# Patient Record
Sex: FEMALE | Race: WHITE | NOT HISPANIC OR LATINO | Employment: FULL TIME | URBAN - METROPOLITAN AREA
[De-identification: names, ages, dates, MRNs, and addresses within clinical notes are randomized per-mention and may not be internally consistent; named-entity substitution may affect disease eponyms.]

---

## 2017-01-11 ENCOUNTER — OFFICE VISIT (OUTPATIENT)
Dept: GASTROENTEROLOGY | Facility: CLINIC | Age: 37
End: 2017-01-11

## 2017-01-11 VITALS
BODY MASS INDEX: 47.09 KG/M2 | HEIGHT: 66 IN | HEART RATE: 107 BPM | DIASTOLIC BLOOD PRESSURE: 74 MMHG | TEMPERATURE: 98.4 F | OXYGEN SATURATION: 98 % | SYSTOLIC BLOOD PRESSURE: 128 MMHG | WEIGHT: 293 LBS

## 2017-01-11 DIAGNOSIS — R79.89 ABNORMAL LIVER FUNCTION TESTS: ICD-10-CM

## 2017-01-11 DIAGNOSIS — K76.0 NON-ALCOHOLIC FATTY LIVER DISEASE: Primary | ICD-10-CM

## 2017-01-11 PROCEDURE — 99213 OFFICE O/P EST LOW 20 MIN: CPT | Performed by: INTERNAL MEDICINE

## 2017-01-11 NOTE — MR AVS SNAPSHOT
Norma Bee   2017 2:00 PM   Office Visit    Dept Phone:  [phone]   Encounter #:  [id number]    Provider:  Deshaun Bonilla MD   Department:  [facility]                Your Full Care Plan              Your Updated Medication List          This list is accurate as of: 17  2:23 PM.  Always use your most recent med list.                cholecalciferol 1000 UNITS tablet   Commonly known as:  VITAMIN D3       FETZIMA 80 MG capsule sustained-release 24 hr   Generic drug:  Levomilnacipran HCl ER       fish oil 1000 MG capsule capsule       freestyle lancets       FREESTYLE LITE test strip   Generic drug:  glucose blood   USE ONE STRIP TO TEST DAILY       hydrOXYzine 25 MG tablet   Commonly known as:  ATARAX       levothyroxine 50 MCG tablet   Commonly known as:  SYNTHROID, LEVOTHROID   Take 1 tablet by mouth Daily.       MULTI-VITAMIN GUMMIES PO               We Performed the Following     Hepatic Function Panel       You Were Diagnosed With        Codes Comments    Non-alcoholic fatty liver disease    -  Primary ICD-10-CM: K76.0  ICD-9-CM: 571.8     Abnormal liver function tests     ICD-10-CM: R79.89  ICD-9-CM: 790.6       Instructions     None    Patient Instructions History      Upcoming Appointments     Visit Type Date Time Department    OFFICE VISIT 2017  2:00 PM [facility]      [employer] Signup     [facility] allows you to send messages to your doctor, view your test results, renew your prescriptions, schedule appointments, and more. To sign up, go to [employer] and click on the Sign Up Now link in the New User? box. Enter your [employer] Activation Code exactly as it appears below along with the last four digits of your Social Security Number and your Date of Birth () to complete the sign-up process. If you do not sign up before the expiration date, you must request a new code.    [employer]  "Activation Code: [email or web address]  Expires: 1/25/2017  2:23 PM    If you have questions, you can email [email or web address] or call [phone] to talk to our MyChart staff. Remember, [employer] is NOT to be used for urgent needs. For medical emergencies, dial 911.               Other Info from Your Visit           Allergies     No Known Allergies      Reason for Visit     Liver Follow-up Fatty Liver disease      Vital Signs     Blood Pressure Pulse Temperature Height Weight Oxygen Saturation    128/74 (BP Location: Right arm, Patient Position: Sitting) 107 98.4 °F (36.9 °C) (Temporal Artery ) 66\" (167.6 cm) 318 lb 12.8 oz (145 kg) 98%    Body Mass Index Smoking Status                51.46 kg/m2 Never Smoker          Problems and Diagnoses Noted     Non-alcoholic fatty liver disease    -  Primary    Abnormal liver function test            "

## 2017-01-11 NOTE — PROGRESS NOTES
Chief Complaint   Patient presents with   • Liver Follow-up     Fatty Liver disease       History of Present Illness:   HPI  Ms. Bee returns to the office for a follow-up visit.  The patient denies any right upper abdominal pain with meals.  She generally will notice some discomfort on the right side if she is anxious.  There is no history of vomiting but the patient may have some nausea and again this seems to be more related to anxiety.  There is no history of difficult or painful swallowing.  The patient has continued to be able to intentionally lose weight gradually.  She denies any blood in the stool.  There is no history of change in bowel habits.  Past Medical History   Diagnosis Date   • Endometrial cancer    • Irritation of right eye 04/07/2016     she will see optho today for exam  Aye Ventura (Internal Medicine)   • Leukocytosis 11/10/2015     repreat in 2 weeks  Aye Ventura (Internal Medicine)   • Ovarian cancer 10/26/2015     followed by gyn/onc uk  Aye Ventura (Internal Medicine)   • RUQ pain 10/26/2015      u/s  Aye Ventura (Internal Medicine)   • Thrombocytosis        Past Surgical History   Procedure Laterality Date   • Hysterectomy     • Left oophorectomy           Current Outpatient Prescriptions:   •  cholecalciferol (VITAMIN D3) 1000 UNITS tablet, Take 1,000 Units by mouth Daily., Disp: , Rfl:   •  FREESTYLE LITE test strip, USE ONE STRIP TO TEST DAILY, Disp: 100 each, Rfl: 0  •  hydrOXYzine (ATARAX) 25 MG tablet, Take 25 mg by mouth 3 (Three) Times a Day As Needed for itching., Disp: , Rfl:   •  Lancets (FREESTYLE) lancets, , Disp: , Rfl:   •  Levomilnacipran HCl ER (FETZIMA) 80 MG capsule sustained-release 24 hr, Take  by mouth Daily., Disp: , Rfl:   •  levothyroxine (SYNTHROID, LEVOTHROID) 50 MCG tablet, Take 1 tablet by mouth Daily., Disp: 30 tablet, Rfl: 1  •  Multiple Vitamins-Minerals (MULTI-VITAMIN GUMMIES PO), Take  by mouth daily., Disp: , Rfl:   •  Omega-3  Fatty Acids (FISH OIL) 1000 MG capsule capsule, Take  by mouth daily with breakfast., Disp: , Rfl:     No Known Allergies    Family History   Problem Relation Age of Onset   • Arthritis Other      osteoarthritis   • Multiple myeloma Other    • Lung cancer Other    • Lymphoma Other      non-Hodgkin's lymphoma   • Thyroid disease Other    • Lymphoma Mother    • Alzheimer's disease Mother    • Thyroid disease Mother    • Diabetes Father    • Cirrhosis Paternal Grandfather        Social History     Social History   • Marital status: Single     Spouse name: N/A   • Number of children: N/A   • Years of education: N/A     Occupational History   • Not on file.     Social History Main Topics   • Smoking status: Never Smoker   • Smokeless tobacco: Never Used   • Alcohol use No      Comment: Rare   • Drug use: No   • Sexual activity: Not on file      Comment: Single      Other Topics Concern   • Not on file     Social History Narrative       Review of Systems   Constitutional: Positive for fatigue. Negative for appetite change, fever and unexpected weight change.   HENT: Positive for sinus pressure. Negative for dental problem, mouth sores, postnasal drip, sneezing, trouble swallowing and voice change.    Eyes: Negative.  Negative for pain, redness and itching.   Respiratory: Negative.  Negative for cough, shortness of breath and wheezing.    Cardiovascular: Negative.  Negative for chest pain, palpitations and leg swelling.   Gastrointestinal: Positive for abdominal pain. Negative for abdominal distention, anal bleeding, blood in stool, constipation, diarrhea, nausea, rectal pain and vomiting.   Endocrine: Negative.  Negative for cold intolerance, heat intolerance, polydipsia and polyuria.   Genitourinary: Negative.  Negative for dysuria, enuresis, flank pain, hematuria and urgency.   Musculoskeletal: Positive for back pain. Negative for arthralgias, joint swelling and myalgias.   Skin: Negative.  Negative for color change,  pallor and rash.   Allergic/Immunologic: Positive for environmental allergies. Negative for food allergies and immunocompromised state.   Neurological: Positive for dizziness. Negative for tremors, seizures, facial asymmetry, numbness and headaches.   Hematological: Negative.    Psychiatric/Behavioral: Positive for sleep disturbance. Negative for behavioral problems, dysphoric mood, hallucinations and self-injury. The patient is nervous/anxious.        Vitals:    01/11/17 1347   BP: 128/74   Pulse: 107   Temp: 98.4 °F (36.9 °C)   SpO2: 98%       Physical Exam   Constitutional: She is oriented to person, place, and time. She appears well-developed and well-nourished. No distress.   obese   HENT:   Head: Normocephalic and atraumatic.   Mouth/Throat: Oropharynx is clear and moist. No oropharyngeal exudate.   Eyes: Conjunctivae and EOM are normal. Pupils are equal, round, and reactive to light. No scleral icterus.   Neck: Normal range of motion. Neck supple. No thyromegaly present.   Cardiovascular: Normal rate, regular rhythm and normal heart sounds.  Exam reveals no gallop.    No murmur heard.  Pulmonary/Chest: Effort normal and breath sounds normal. She has no wheezes. She has no rales.   Abdominal: Soft. Bowel sounds are normal. There is no tenderness. There is no rebound and no guarding.   Musculoskeletal: Normal range of motion. She exhibits no edema or tenderness.   Lymphadenopathy:     She has no cervical adenopathy.   Neurological: She is alert and oriented to person, place, and time. She has normal reflexes. She exhibits normal muscle tone.   Skin: Skin is dry. No rash noted. No erythema.   Psychiatric: She has a normal mood and affect. Her behavior is normal. Thought content normal.   Vitals reviewed.      Norma was seen today for liver follow-up.    Diagnoses and all orders for this visit:    Non-alcoholic fatty liver disease  -     Hepatic Function Panel  -     US Liver; Future    Abnormal liver  function tests  The patient has no stigmata of chronic liver disease. She has been able to lose weight gradually over the last year.  The patient does not have any right upper abdominal pain that is related to meals. She denies any epigastric discomfort.      Plan: Encouraged continued gradual weight loss.           Will check a hepatic function panel today.           Will schedule for an ultrasound of the liver.           Will follow up in 6 months.    I spent 50% of the visit counseling and answering questions from the patient.

## 2017-01-12 LAB
ALBUMIN SERPL-MCNC: 4.4 G/DL (ref 3.2–4.8)
ALP SERPL-CCNC: 108 U/L (ref 25–100)
ALT SERPL-CCNC: 33 U/L (ref 7–40)
AST SERPL-CCNC: 27 U/L (ref 0–33)
BILIRUB DIRECT SERPL-MCNC: 0.1 MG/DL (ref 0–0.2)
BILIRUB SERPL-MCNC: 0.4 MG/DL (ref 0.3–1.2)
PROT SERPL-MCNC: 7.8 G/DL (ref 5.7–8.2)

## 2017-01-13 ENCOUNTER — TELEPHONE (OUTPATIENT)
Dept: GASTROENTEROLOGY | Facility: CLINIC | Age: 37
End: 2017-01-13

## 2017-01-13 NOTE — TELEPHONE ENCOUNTER
----- Message from Deshaun Bonilla MD sent at 1/12/2017  3:21 PM EST -----  Let Ms. Bee know that the liver test was good.  Thanks,  GMW

## 2017-01-18 ENCOUNTER — HOSPITAL ENCOUNTER (OUTPATIENT)
Dept: ULTRASOUND IMAGING | Facility: HOSPITAL | Age: 37
Discharge: HOME | End: 2017-01-18
Attending: INTERNAL MEDICINE | Admitting: INTERNAL MEDICINE

## 2017-01-18 ENCOUNTER — TELEPHONE (OUTPATIENT)
Dept: GASTROENTEROLOGY | Facility: CLINIC | Age: 37
End: 2017-01-18

## 2017-01-18 DIAGNOSIS — K76.0 NON-ALCOHOLIC FATTY LIVER DISEASE: ICD-10-CM

## 2017-01-18 PROCEDURE — 76705 ECHO EXAM OF ABDOMEN: CPT

## 2017-01-18 NOTE — TELEPHONE ENCOUNTER
I called and discussed the results of the ultrasound. I stated there were gallstones present. She does not have any symptoms of biliary colic at this juncture.

## 2017-01-31 ENCOUNTER — OFFICE VISIT (OUTPATIENT)
Dept: RETAIL CLINIC | Facility: CLINIC | Age: 37
End: 2017-01-31

## 2017-01-31 VITALS
OXYGEN SATURATION: 98 % | TEMPERATURE: 98.4 F | DIASTOLIC BLOOD PRESSURE: 86 MMHG | HEIGHT: 66 IN | HEART RATE: 101 BPM | SYSTOLIC BLOOD PRESSURE: 124 MMHG | RESPIRATION RATE: 18 BRPM | WEIGHT: 293 LBS | BODY MASS INDEX: 47.09 KG/M2

## 2017-01-31 DIAGNOSIS — J35.8 TONSIL STONE: ICD-10-CM

## 2017-01-31 DIAGNOSIS — H65.01 RIGHT ACUTE SEROUS OTITIS MEDIA, RECURRENCE NOT SPECIFIED: Primary | ICD-10-CM

## 2017-01-31 DIAGNOSIS — J02.9 ACUTE PHARYNGITIS, UNSPECIFIED ETIOLOGY: ICD-10-CM

## 2017-01-31 PROBLEM — D75.839 THROMBOCYTHEMIA: Status: ACTIVE | Noted: 2017-01-31

## 2017-01-31 PROBLEM — D72.829 LEUKOCYTOSIS: Status: ACTIVE | Noted: 2017-01-31

## 2017-01-31 LAB
EXPIRATION DATE: NORMAL
INTERNAL CONTROL: NORMAL
Lab: NORMAL
S PYO AG THROAT QL: NEGATIVE

## 2017-01-31 PROCEDURE — 87880 STREP A ASSAY W/OPTIC: CPT | Performed by: NURSE PRACTITIONER

## 2017-01-31 PROCEDURE — 99213 OFFICE O/P EST LOW 20 MIN: CPT | Performed by: NURSE PRACTITIONER

## 2017-01-31 RX ORDER — FLUTICASONE PROPIONATE 50 MCG
2 SPRAY, SUSPENSION (ML) NASAL DAILY
Qty: 1 EACH | Refills: 0 | Status: SHIPPED | OUTPATIENT
Start: 2017-01-31 | End: 2017-03-02

## 2017-01-31 RX ORDER — LORATADINE 10 MG/1
10 TABLET ORAL DAILY
Qty: 30 TABLET | Refills: 0 | Status: SHIPPED | OUTPATIENT
Start: 2017-01-31 | End: 2017-02-24

## 2017-01-31 RX ORDER — PSEUDOEPHEDRINE HCL 30 MG
30 TABLET ORAL EVERY 6 HOURS PRN
Qty: 24 TABLET | Refills: 0 | Status: SHIPPED | OUTPATIENT
Start: 2017-01-31 | End: 2017-02-24

## 2017-01-31 NOTE — PATIENT INSTRUCTIONS
Tonsil Stones (Tonsilloliths)  If someone asked you where stones can form in the human body, you might think of the kidneys. But, the kidneys aren't the only place. The tonsils are another location where hard, and sometimes, painful stones may develop in certain people.    What Are Tonsils?    Your tonsils are gland-like structures in the back of your throat. You have one located in a pocket on each side. Tonsils are made of tissue that contains lymphocytes -- cells in your body that prevent and fight infections. It is believed that the tonsils play a role in the immune system and are meant to function like nets, trapping incoming bacteria and virus particles that are passing through your throat.    Most medical experts agree that the tonsils often do not perform their job well. In many instances, they become more of a hindrance than a help. It may be that tonsils evolved in an environment where humans were not exposed to as many germs as we encounter today as a result of living in areas with relatively high populations. Evidence suggests that people who have had their tonsils removed are no more likely to suffer from bacterial or viral infections than people with intact tonsils.    What Causes Tonsil Stones?    Your tonsils are filled with nooks and crannies where bacteria and other materials, including dead cells and mucous, can become trapped. When this happens, the debris can become concentrated in white formations that occur in the pockets.    Tonsil stones, or tonsilloliths, are formed when this trapped debris hardens, or calcifies. This tends to happen most often in people who have chronic inflammation in their tonsils or repeated bouts of tonsillitis.    While many people have small tonsilloliths that develop in their tonsils, it is quite rare to have a large and solidified tonsil stone.    What Are the Symptoms of Tonsil Stones?    Many small tonsil stones do not cause any noticeable symptoms. Even when  they are large, some tonsil stones are only discovered incidentally on X-rays or CT scans. Some larger tonsilloliths, however, may have multiple symptoms:    Bad breath . One of the prime indicators of a tonsil stone is exceedingly bad breath, or halitosis, that accompanies a tonsil infection. One study of patients with a form of chronic tonsillitis used a special test to see if volatile sulfur compounds were contained in the subjects' breath. The presence of these foul-smelling compounds provides evidence of bad breath. The researchers found that 75% of the people who had abnormally high concentrations of these compounds also had tonsil stones. Other researchers have suggested that tonsil stones be considered in situations when the cause of bad breath is in question.  Sore throat . When a tonsil stone and tonsillitis occur together, it can be difficult to determine whether the pain in your throat is caused by your infection or the tonsil stone. The presence of a tonsil stone itself, though, may cause you to feel pain or discomfort in the area where it is lodged.  White debris. Some tonsil stones are visible in the back of the throat as a lump of solid white material. This is not always the case. Often they are hidden in the folds of the tonsils. In these instances, they may only be detectable with the help of non-invasive scanning techniques, such as CT scans or magnetic resonance imaging.  Difficulty swallowing. Depending on the location or size of the tonsil stone, it may be difficult or painful to swallow foods or liquids.  Ear pain . Tonsil stones can develop anywhere in the tonsil. Because of shared nerve pathways, they may cause a person to feel pain in the ear, even though the stone itself is not touching the ear.  Tonsil swelling. When collected debris hardens and a tonsil stone forms, inflammation from infection (if present) and the tonsil stone itself may cause a tonsil to swell or become larger.  How Are  Tonsil Stones Treated?    The appropriate treatment for a tonsil stone depends on the size of the tonsillolith and its potential to cause discomfort or harm. Options include:    No treatment. Many tonsil stones, especially ones that have no symptoms, require no special treatment.  At-home removal. Some people choose to dislodge tonsil stones at home with the use of picks or swabs.  Salt water gargles. Gargling with warm, salty water may help ease the discomfort of tonsillitis, which often accompanies tonsil stones.  Antibiotics. Various antibiotics can be used to treat tonsil stones. While they may be helpful for some people, they cannot correct the basic problem that is causing tonsilloliths. Also, antibiotics can have side effects.  Surgical removal. When tonsil stones are exceedingly large and symptomatic, it may be necessary for a surgeon to remove them. In certain instances, a doctor will be able to perform this relatively simple procedure using a local numbing agent. Then the patient will not need general anesthesia.  Can Tonsil Stones Be Prevented?    Since tonsil stones are more common in people who have chronic tonsillitis, the only surefire way to prevent them is with surgical removal of the tonsils. This procedure, known as a tonsillectomy, removes the tissues of the tonsils entirely, thereby eliminating the possibility of tonsillolith formation.    Unlike tonsil stone extraction, tonsillectomies are typically performed under general anesthesia. Patients who undergo the surgery have difficulty swallowing and a sore throat for at least a few days after the procedure.  Serous Otitis Media  Serous otitis media is fluid in the middle ear space. This space contains the bones for hearing and air. Air in the middle ear space helps to transmit sound.   The air gets there through the eustachian tube. This tube goes from the back of the nose (nasopharynx) to the middle ear space. It keeps the pressure in the middle  ear the same as the outside world. It also helps to drain fluid from the middle ear space.  CAUSES   Serous otitis media occurs when the eustachian tube gets blocked. Blockage can come from:  · Ear infections.  · Colds and other upper respiratory infections.  · Allergies.  · Irritants such as cigarette smoke.  · Sudden changes in air pressure (such as descending in an airplane).  · Enlarged adenoids.  · A mass in the nasopharynx.  During colds and upper respiratory infections, the middle ear space can become temporarily filled with fluid. This can happen after an ear infection also. Once the infection clears, the fluid will generally drain out of the ear through the eustachian tube. If it does not, then serous otitis media occurs.  SIGNS AND SYMPTOMS   · Hearing loss.  · A feeling of fullness in the ear, without pain.  · Young children may not show any symptoms but may show slight behavioral changes, such as agitation, ear pulling, or crying.  DIAGNOSIS   Serous otitis media is diagnosed by an ear exam. Tests may be done to check on the movement of the eardrum. Hearing exams may also be done.  TREATMENT   The fluid most often goes away without treatment. If allergy is the cause, allergy treatment may be helpful. Fluid that persists for several months may require minor surgery. A small tube is placed in the eardrum to:  · Drain the fluid.  · Restore the air in the middle ear space.  In certain situations, antibiotic medicines are used to avoid surgery. Surgery may be done to remove enlarged adenoids (if this is the cause).  HOME CARE INSTRUCTIONS   · Keep children away from tobacco smoke.  · Keep all follow-up visits as directed by your health care provider.  SEEK MEDICAL CARE IF:   · Your hearing is not better in 3 months.  · Your hearing is worse.  · You have ear pain.  · You have drainage from the ear.  · You have dizziness.  · You have serous otitis media only in one ear or have any bleeding from your nose  (epistaxis).  · You notice a lump on your neck.  MAKE SURE YOU:  · Understand these instructions.    · Will watch your condition.    · Will get help right away if you are not doing well or get worse.       This information is not intended to replace advice given to you by your health care provider. Make sure you discuss any questions you have with your health care provider.     Document Released: 03/09/2005 Document Revised: 01/08/2016 Document Reviewed: 07/15/2014  [employer] Interactive Patient Education ©2016 [employer]

## 2017-01-31 NOTE — MR AVS SNAPSHOT
Norma Bee   1/31/2017 12:30 PM   Office Visit    Dept Phone:  [phone]   Encounter #:  [id number]    Provider:  ZACHARY DENIS   Department:  [facility]                Your Full Care Plan              Today's Medication Changes          These changes are accurate as of: 1/31/17  1:01 PM.  If you have any questions, ask your nurse or doctor.               New Medication(s)Ordered:     fluticasone 50 MCG/ACT nasal spray   Commonly known as:  FLONASE   2 sprays into each nostril Daily for 30 days.       loratadine 10 MG tablet   Commonly known as:  CLARITIN   Take 1 tablet by mouth Daily.       pseudoephedrine 30 MG tablet   Commonly known as:  SUDAFED   Take 1 tablet by mouth Every 6 (Six) Hours As Needed for congestion.            Where to Get Your Medications      These medications were sent to [address] - [address] [place] [address] - [phone]  - [phone]   [address] [place] [address]     Phone:  [phone]     fluticasone 50 MCG/ACT nasal spray    loratadine 10 MG tablet    pseudoephedrine 30 MG tablet                  Your Updated Medication List          This list is accurate as of: 1/31/17  1:01 PM.  Always use your most recent med list.                cholecalciferol 1000 UNITS tablet   Commonly known as:  VITAMIN D3       FETZIMA 80 MG capsule sustained-release 24 hr   Generic drug:  Levomilnacipran HCl ER       fish oil 1000 MG capsule capsule       fluticasone 50 MCG/ACT nasal spray   Commonly known as:  FLONASE   2 sprays into each nostril Daily for 30 days.       freestyle lancets       FREESTYLE LITE test strip   Generic drug:  glucose blood   USE ONE STRIP TO TEST DAILY       hydrOXYzine 25 MG tablet   Commonly known as:  ATARAX       levothyroxine 50 MCG tablet   Commonly known as:  SYNTHROID, LEVOTHROID   Take 1 tablet by mouth Daily.       loratadine 10 MG tablet   Commonly known as:   CLARITIN   Take 1 tablet by mouth Daily.       MULTI-VITAMIN GUMMIES PO       pseudoephedrine 30 MG tablet   Commonly known as:  SUDAFED   Take 1 tablet by mouth Every 6 (Six) Hours As Needed for congestion.               You Were Diagnosed With        Codes Comments    Right acute serous otitis media, recurrence not specified    -  Primary ICD-10-CM: H65.01  ICD-9-CM: 381.01     Tonsil stone     ICD-10-CM: J35.8  ICD-9-CM: 474.8       Instructions    Tonsil Stones (Tonsilloliths)  If someone asked you where stones can form in the human body, you might think of the kidneys. But, the kidneys aren't the only place. The tonsils are another location where hard, and sometimes, painful stones may develop in certain people.    What Are Tonsils?    Your tonsils are gland-like structures in the back of your throat. You have one located in a pocket on each side. Tonsils are made of tissue that contains lymphocytes -- cells in your body that prevent and fight infections. It is believed that the tonsils play a role in the immune system and are meant to function like nets, trapping incoming bacteria and virus particles that are passing through your throat.    Most medical experts agree that the tonsils often do not perform their job well. In many instances, they become more of a hindrance than a help. It may be that tonsils evolved in an environment where humans were not exposed to as many germs as we encounter today as a result of living in areas with relatively high populations. Evidence suggests that people who have had their tonsils removed are no more likely to suffer from bacterial or viral infections than people with intact tonsils.    What Causes Tonsil Stones?    Your tonsils are filled with nooks and crannies where bacteria and other materials, including dead cells and mucous, can become trapped. When this happens, the debris can become concentrated in white formations that occur in the pockets.    Tonsil stones, or  tonsilloliths, are formed when this trapped debris hardens, or calcifies. This tends to happen most often in people who have chronic inflammation in their tonsils or repeated bouts of tonsillitis.    While many people have small tonsilloliths that develop in their tonsils, it is quite rare to have a large and solidified tonsil stone.    What Are the Symptoms of Tonsil Stones?    Many small tonsil stones do not cause any noticeable symptoms. Even when they are large, some tonsil stones are only discovered incidentally on X-rays or CT scans. Some larger tonsilloliths, however, may have multiple symptoms:    Bad breath . One of the prime indicators of a tonsil stone is exceedingly bad breath, or halitosis, that accompanies a tonsil infection. One study of patients with a form of chronic tonsillitis used a special test to see if volatile sulfur compounds were contained in the subjects' breath. The presence of these foul-smelling compounds provides evidence of bad breath. The researchers found that 75% of the people who had abnormally high concentrations of these compounds also had tonsil stones. Other researchers have suggested that tonsil stones be considered in situations when the cause of bad breath is in question.  Sore throat . When a tonsil stone and tonsillitis occur together, it can be difficult to determine whether the pain in your throat is caused by your infection or the tonsil stone. The presence of a tonsil stone itself, though, may cause you to feel pain or discomfort in the area where it is lodged.  White debris. Some tonsil stones are visible in the back of the throat as a lump of solid white material. This is not always the case. Often they are hidden in the folds of the tonsils. In these instances, they may only be detectable with the help of non-invasive scanning techniques, such as CT scans or magnetic resonance imaging.  Difficulty swallowing. Depending on the location or size of the tonsil stone,  it may be difficult or painful to swallow foods or liquids.  Ear pain . Tonsil stones can develop anywhere in the tonsil. Because of shared nerve pathways, they may cause a person to feel pain in the ear, even though the stone itself is not touching the ear.  Tonsil swelling. When collected debris hardens and a tonsil stone forms, inflammation from infection (if present) and the tonsil stone itself may cause a tonsil to swell or become larger.  How Are Tonsil Stones Treated?    The appropriate treatment for a tonsil stone depends on the size of the tonsillolith and its potential to cause discomfort or harm. Options include:    No treatment. Many tonsil stones, especially ones that have no symptoms, require no special treatment.  At-home removal. Some people choose to dislodge tonsil stones at home with the use of picks or swabs.  Salt water gargles. Gargling with warm, salty water may help ease the discomfort of tonsillitis, which often accompanies tonsil stones.  Antibiotics. Various antibiotics can be used to treat tonsil stones. While they may be helpful for some people, they cannot correct the basic problem that is causing tonsilloliths. Also, antibiotics can have side effects.  Surgical removal. When tonsil stones are exceedingly large and symptomatic, it may be necessary for a surgeon to remove them. In certain instances, a doctor will be able to perform this relatively simple procedure using a local numbing agent. Then the patient will not need general anesthesia.  Can Tonsil Stones Be Prevented?    Since tonsil stones are more common in people who have chronic tonsillitis, the only surefire way to prevent them is with surgical removal of the tonsils. This procedure, known as a tonsillectomy, removes the tissues of the tonsils entirely, thereby eliminating the possibility of tonsillolith formation.    Unlike tonsil stone extraction, tonsillectomies are typically performed under general anesthesia. Patients  who undergo the surgery have difficulty swallowing and a sore throat for at least a few days after the procedure.  Serous Otitis Media  Serous otitis media is fluid in the middle ear space. This space contains the bones for hearing and air. Air in the middle ear space helps to transmit sound.   The air gets there through the eustachian tube. This tube goes from the back of the nose (nasopharynx) to the middle ear space. It keeps the pressure in the middle ear the same as the outside world. It also helps to drain fluid from the middle ear space.  CAUSES   Serous otitis media occurs when the eustachian tube gets blocked. Blockage can come from:  · Ear infections.  · Colds and other upper respiratory infections.  · Allergies.  · Irritants such as cigarette smoke.  · Sudden changes in air pressure (such as descending in an airplane).  · Enlarged adenoids.  · A mass in the nasopharynx.  During colds and upper respiratory infections, the middle ear space can become temporarily filled with fluid. This can happen after an ear infection also. Once the infection clears, the fluid will generally drain out of the ear through the eustachian tube. If it does not, then serous otitis media occurs.  SIGNS AND SYMPTOMS   · Hearing loss.  · A feeling of fullness in the ear, without pain.  · Young children may not show any symptoms but may show slight behavioral changes, such as agitation, ear pulling, or crying.  DIAGNOSIS   Serous otitis media is diagnosed by an ear exam. Tests may be done to check on the movement of the eardrum. Hearing exams may also be done.  TREATMENT   The fluid most often goes away without treatment. If allergy is the cause, allergy treatment may be helpful. Fluid that persists for several months may require minor surgery. A small tube is placed in the eardrum to:  · Drain the fluid.  · Restore the air in the middle ear space.  In certain situations, antibiotic medicines are used to avoid surgery. Surgery may  be done to remove enlarged adenoids (if this is the cause).  HOME CARE INSTRUCTIONS   · Keep children away from tobacco smoke.  · Keep all follow-up visits as directed by your health care provider.  SEEK MEDICAL CARE IF:   · Your hearing is not better in 3 months.  · Your hearing is worse.  · You have ear pain.  · You have drainage from the ear.  · You have dizziness.  · You have serous otitis media only in one ear or have any bleeding from your nose (epistaxis).  · You notice a lump on your neck.  MAKE SURE YOU:  · Understand these instructions.    · Will watch your condition.    · Will get help right away if you are not doing well or get worse.       This information is not intended to replace advice given to you by your health care provider. Make sure you discuss any questions you have with your health care provider.     Document Released: 2005 Document Revised: 2016 Document Reviewed: 07/15/2014  [employer] Interactive Patient Education © [employer]       Patient Instructions History      Upcoming Appointments     Visit Type Date Time Department    OFFICE VISIT 2017 12:30 PM [facility] [place]    OFFICE VISIT 2017  1:30 PM [facility] [place]      [employer] Signup     [employer] allows you to send messages to your doctor, view your test results, renew your prescriptions, schedule appointments, and more. To sign up, go to [employer] and click on the Sign Up Now link in the New User? box. Enter your [employer] Activation Code exactly as it appears below along with the last four digits of your Social Security Number and your Date of Birth () to complete the sign-up process. If you do not sign up before the expiration date, you must request a new code.    [employer] Activation Code: [email or web address]  Expires: 2017  1:01 PM    If you have questions, you can email [email or web address] or call [phone] to talk to our [employer] staff. Remember,  "MyChart is NOT to be used for urgent needs. For medical emergencies, dial 911.               Other Info from Your Visit           Your Appointments     Jul 12, 2017  1:30 PM EDT   Office Visit with Deshaun Bonilla MD   [facility] (--)    [address]  [facility] [id number] [phone]           Arrive 15 minutes prior to appointment.              Allergies     No Known Allergies      Reason for Visit     Sore Throat     Earache right for 3 days    Sinusitis           Vital Signs     Blood Pressure Pulse Temperature Respirations Height Weight    124/86 (BP Location: Right arm, Patient Position: Sitting, Cuff Size: Large Adult) 101 98.4 °F (36.9 °C) 18 66\" (167.6 cm) 319 lb 3.2 oz (145 kg)    Oxygen Saturation Body Mass Index Smoking Status             98% 51.52 kg/m2 Never Smoker         Problems and Diagnoses Noted     Elevated white blood cell count    High platelet count    Right middle ear infection    -  Primary    Tonsil stone            "

## 2017-01-31 NOTE — PROGRESS NOTES
Subjective   Norma Bee is a 36 y.o. female.   Chief Complaint   Patient presents with   • Sore Throat   • Earache     right for 3 days   • Sinusitis     History of Present Illness Struggling with sinus for week and half, right ear acing for 3 days with low level ticking sound. Today throat feeling irritated and noticed 2 white spots. Has not taken any cold med      The following portions of the patient's history were reviewed and updated as appropriate: allergies, current medications, past medical history, past social history, past surgical history and problem list.    Review of Systems   Constitutional: Negative for fever.   HENT: Positive for congestion, ear pain, sinus pressure and sore throat.    Respiratory: Positive for cough. Negative for shortness of breath.    Cardiovascular: Negative.    Gastrointestinal: Positive for nausea.       Objective   Vitals:    01/31/17 1234   BP: 124/86   Pulse: 101   Resp: 18   Temp: 98.4 °F (36.9 °C)   SpO2: 98%     Physical Exam   Constitutional: She is oriented to person, place, and time. She appears well-developed and well-nourished. She does not appear ill. No distress.   HENT:   Right Ear: Tympanic membrane is injected.   Left Ear: Tympanic membrane and ear canal normal.   Right EAC slightly erythematous.  Tonsils 1 +, erythema with bilateral tonsiliths     Cardiovascular: Normal rate, regular rhythm and normal heart sounds.    Pulmonary/Chest: Effort normal and breath sounds normal.   Lymphadenopathy:     She has cervical adenopathy (shotty, tender).   Neurological: She is alert and oriented to person, place, and time.   Skin: Skin is warm and dry.     Results for orders placed or performed in visit on 01/31/17   POC Rapid Strep A   Result Value Ref Range    Rapid Strep A Screen Negative Negative, VALID, INVALID, Not Performed    Internal Control Passed Passed    Lot Number [id number]     Expiration Date 7/2018            Assessment/Plan   Norma was seen  today for sore throat, earache and sinusitis.    Diagnoses and all orders for this visit:    Right acute serous otitis media, recurrence not specified    Tonsil stone    Acute pharyngitis, unspecified etiology  -     POC Rapid Strep A    Other orders  -     fluticasone (FLONASE) 50 MCG/ACT nasal spray; 2 sprays into each nostril Daily for 30 days.  -     loratadine (CLARITIN) 10 MG tablet; Take 1 tablet by mouth Daily.  -     pseudoephedrine (SUDAFED) 30 MG tablet; Take 1 tablet by mouth Every 6 (Six) Hours As Needed for congestion.                   Home care instruction reviewed with patient and/or caregiver who acknowledges understanding, questions answered.    Karishma Crandall, APRN

## 2017-02-24 ENCOUNTER — OFFICE VISIT (OUTPATIENT)
Dept: INTERNAL MEDICINE | Facility: CLINIC | Age: 37
End: 2017-02-24

## 2017-02-24 VITALS
OXYGEN SATURATION: 99 % | TEMPERATURE: 97.7 F | DIASTOLIC BLOOD PRESSURE: 90 MMHG | WEIGHT: 293 LBS | SYSTOLIC BLOOD PRESSURE: 120 MMHG | HEART RATE: 84 BPM | BODY MASS INDEX: 52.5 KG/M2

## 2017-02-24 DIAGNOSIS — E11.9 CONTROLLED TYPE 2 DIABETES MELLITUS WITHOUT COMPLICATION, WITHOUT LONG-TERM CURRENT USE OF INSULIN (HCC): ICD-10-CM

## 2017-02-24 DIAGNOSIS — J30.1 SEASONAL ALLERGIC RHINITIS DUE TO POLLEN: Primary | ICD-10-CM

## 2017-02-24 DIAGNOSIS — G47.09 OTHER INSOMNIA: ICD-10-CM

## 2017-02-24 LAB
ALBUMIN SERPL-MCNC: 4 G/DL (ref 3.2–4.8)
ALBUMIN/GLOB SERPL: 1.3 G/DL (ref 1.5–2.5)
ALP SERPL-CCNC: 115 U/L (ref 25–100)
ALT SERPL W P-5'-P-CCNC: 25 U/L (ref 7–40)
ANION GAP SERPL CALCULATED.3IONS-SCNC: 12 MMOL/L (ref 3–11)
AST SERPL-CCNC: 24 U/L (ref 0–33)
BILIRUB SERPL-MCNC: 0.3 MG/DL (ref 0.3–1.2)
BUN BLD-MCNC: 16 MG/DL (ref 9–23)
BUN/CREAT SERPL: 17.8 (ref 7–25)
CALCIUM SPEC-SCNC: 9.2 MG/DL (ref 8.7–10.4)
CHLORIDE SERPL-SCNC: 104 MMOL/L (ref 99–109)
CO2 SERPL-SCNC: 20 MMOL/L (ref 20–31)
CREAT BLD-MCNC: 0.9 MG/DL (ref 0.6–1.3)
GFR SERPL CREATININE-BSD FRML MDRD: 70 ML/MIN/1.73
GLOBULIN UR ELPH-MCNC: 3.2 GM/DL
GLUCOSE BLD-MCNC: 135 MG/DL (ref 70–100)
GLUCOSE BLDC GLUCOMTR-MCNC: 174 MG/DL (ref 70–130)
HBA1C MFR BLD: 5.3 %
POC CREATININE URINE: 200
POC MICROALBUMIN URINE: 30
POTASSIUM BLD-SCNC: 4 MMOL/L (ref 3.5–5.5)
PROT SERPL-MCNC: 7.2 G/DL (ref 5.7–8.2)
SODIUM BLD-SCNC: 136 MMOL/L (ref 132–146)

## 2017-02-24 PROCEDURE — 82044 UR ALBUMIN SEMIQUANTITATIVE: CPT | Performed by: INTERNAL MEDICINE

## 2017-02-24 PROCEDURE — 99213 OFFICE O/P EST LOW 20 MIN: CPT | Performed by: INTERNAL MEDICINE

## 2017-02-24 PROCEDURE — 90471 IMMUNIZATION ADMIN: CPT | Performed by: INTERNAL MEDICINE

## 2017-02-24 PROCEDURE — 82947 ASSAY GLUCOSE BLOOD QUANT: CPT | Performed by: INTERNAL MEDICINE

## 2017-02-24 PROCEDURE — 90686 IIV4 VACC NO PRSV 0.5 ML IM: CPT | Performed by: INTERNAL MEDICINE

## 2017-02-24 PROCEDURE — 36415 COLL VENOUS BLD VENIPUNCTURE: CPT | Performed by: INTERNAL MEDICINE

## 2017-02-24 PROCEDURE — 82570 ASSAY OF URINE CREATININE: CPT | Performed by: INTERNAL MEDICINE

## 2017-02-24 PROCEDURE — 80053 COMPREHEN METABOLIC PANEL: CPT | Performed by: INTERNAL MEDICINE

## 2017-02-24 PROCEDURE — 83036 HEMOGLOBIN GLYCOSYLATED A1C: CPT | Performed by: INTERNAL MEDICINE

## 2017-02-24 RX ORDER — CETIRIZINE HYDROCHLORIDE 10 MG/1
10 TABLET ORAL DAILY
Qty: 30 TABLET | Refills: 3 | Status: SHIPPED | OUTPATIENT
Start: 2017-02-24 | End: 2017-07-21

## 2017-02-24 NOTE — PROGRESS NOTES
Subjective   Norma Bee is a 36 y.o. female.   Chief Complaint   Patient presents with   • Allergies   • Insomnia         Insomnia   This is a chronic problem. The current episode started more than 1 year ago. The problem occurs daily. Associated symptoms include congestion. Pertinent negatives include no abdominal pain, arthralgias, chest pain, chills, coughing, diaphoresis, fatigue, fever, headaches, myalgias, nausea, neck pain, numbness, rash, sore throat or vomiting. Treatments tried: had sleep study and is using cpap.   Diabetes   She presents for her follow-up diabetic visit. Pertinent negatives for hypoglycemia include no confusion, headaches, nervousness/anxiousness, pallor, seizures or speech difficulty. Pertinent negatives for diabetes include no chest pain, no fatigue, no polydipsia and no polyphagia. There are no diabetic complications. Meal planning includes carbohydrate counting.    Allergies that are seasonal times years. Claritin not helping anymore.     The following portions of the patient's history were reviewed and updated as appropriate: allergies, current medications, past family history, past medical history, past social history, past surgical history and problem list.  Past Medical History   Diagnosis Date   • Endometrial cancer    • Endometrial cancer      follows with gyn onc at    • Irritation of right eye 04/07/2016     she will see optho today for exam  [facility] (Internal Medicine)   • Leukocytosis 11/10/2015     repreat in 2 weeks  [facility] (Internal Medicine)   • Ovarian cancer 10/26/2015     followed by gyn/onc   [facility] (Internal Medicine)   • Ovarian cancer    • RUQ pain 10/26/2015      u/s  [facility] (Internal Medicine)   • Thrombocytosis        Review of Systems   Constitutional: Negative for activity change, appetite change, chills, diaphoresis, fatigue, fever and unexpected weight change.   HENT: Positive for congestion. Negative  for ear discharge, ear pain, mouth sores, nosebleeds, sinus pressure, sneezing and sore throat.    Eyes: Negative for pain, discharge and itching.   Respiratory: Negative for cough, chest tightness, shortness of breath and wheezing.    Cardiovascular: Negative for chest pain, palpitations and leg swelling.   Gastrointestinal: Negative for abdominal pain, constipation, diarrhea, nausea and vomiting.   Endocrine: Negative for cold intolerance, heat intolerance, polydipsia and polyphagia.   Genitourinary: Negative for dysuria, flank pain, frequency, hematuria and urgency.   Musculoskeletal: Negative for arthralgias, back pain, gait problem, myalgias, neck pain and neck stiffness.   Skin: Negative for color change, pallor and rash.   Neurological: Negative for seizures, speech difficulty, numbness and headaches.   Psychiatric/Behavioral: Positive for sleep disturbance. Negative for agitation, confusion and decreased concentration. The patient has insomnia. The patient is not nervous/anxious.      Visit Vitals   • /90   • Pulse 84   • Temp 97.7 °F (36.5 °C)   • Wt (!) 325 lb 4.8 oz (148 kg)   • SpO2 99%   • BMI 52.5 kg/m2       Objective   Physical Exam   Constitutional: She appears well-developed.   HENT:   Head: Normocephalic.   Right Ear: External ear normal.   Left Ear: External ear normal.   Nose: Nose normal.   Mouth/Throat: Oropharynx is clear and moist.   Eyes: Conjunctivae are normal. Pupils are equal, round, and reactive to light.   Neck: No JVD present. No thyromegaly present.   Cardiovascular: Normal rate, regular rhythm and normal heart sounds.  Exam reveals no friction rub.    No murmur heard.  Pulmonary/Chest: Effort normal and breath sounds normal. No respiratory distress. She has no wheezes. She has no rales.   Abdominal: Soft. Bowel sounds are normal. She exhibits no distension. There is no tenderness. There is no guarding.   Musculoskeletal: She exhibits no edema or tenderness.    Norma had  a diabetic foot exam performed today.  Lymphadenopathy:     She has no cervical adenopathy.   Neurological: She displays normal reflexes. No cranial nerve deficit.   Skin: No rash noted.   Psychiatric: Her behavior is normal.   Nursing note and vitals reviewed.      Assessment/Plan   Norma was seen today for allergies and insomnia.    Diagnoses and all orders for this visit:    Seasonal allergic rhinitis due to pollen  -     cetirizine (zyrTEC) 10 MG tablet; Take 1 tablet by mouth Daily.    Other insomnia  -     Ambulatory Referral to Sleep Medicine    Controlled type 2 diabetes mellitus without complication, without long-term current use of insulin  -     Comprehensive Metabolic Panel  -     POC Microalbumin  -     POC Glucose Fingerstick  -     POC Glycosylated Hemoglobin (Hb A1C)  A1c is 5.3 and is not on any diabetic meds.   Other orders  -     Flu Vaccine Split Quad

## 2017-03-19 ENCOUNTER — OFFICE VISIT (OUTPATIENT)
Dept: RETAIL CLINIC | Facility: CLINIC | Age: 37
End: 2017-03-19

## 2017-03-19 VITALS — TEMPERATURE: 97.7 F | OXYGEN SATURATION: 98 % | HEART RATE: 84 BPM

## 2017-03-19 DIAGNOSIS — H10.021 PINK EYE, RIGHT: Primary | ICD-10-CM

## 2017-03-19 PROCEDURE — 99213 OFFICE O/P EST LOW 20 MIN: CPT | Performed by: NURSE PRACTITIONER

## 2017-03-19 NOTE — PROGRESS NOTES
Subjective   Norma Bee is a 36 y.o. female.   Chief Complaint   Patient presents with   • Conjunctivitis     History of Present Illness C/O right eye redness, irritation, itching for a couple of days. Only has slight drainage and some crusting in the mornings. Is light sensitive but no vision change. No recollection of trauma to eye. Not contact lense wearer. HAs tried allergy drops but hasn't helped.    The following portions of the patient's history were reviewed and updated as appropriate: allergies, current medications, past medical history, past social history, past surgical history and problem list.    Review of Systems   HENT: Positive for congestion. Ear pain: ear pressure right.    Eyes: Positive for photophobia, discharge, redness and itching. Negative for visual disturbance.       Objective   Vitals:    03/19/17 1112   Pulse: 84   Temp: 97.7 °F (36.5 °C)   SpO2: 98%     Physical Exam   Constitutional: She is oriented to person, place, and time. She appears well-developed and well-nourished. No distress.   Eyes: EOM are normal. Right conjunctiva is injected (more medial).       Lymphadenopathy:     She has no cervical adenopathy.   Neurological: She is alert and oriented to person, place, and time.   Skin: Skin is warm.           Assessment/Plan   Norma was seen today for conjunctivitis.    Diagnoses and all orders for this visit:    Pink eye, right    Other orders  -     neomycin-polymyxin-dexamethasone (MAXITROL) 0.1 % ophthalmic suspension; Administer 1 drop to the right eye 4 (Four) Times a Day for 7 days.                   Home care instruction reviewed with patient and/or caregiver who acknowledges understanding, questions answered.    Karishma Crandall, XAVIER

## 2017-03-29 ENCOUNTER — OFFICE VISIT (OUTPATIENT)
Dept: INTERNAL MEDICINE | Facility: CLINIC | Age: 37
End: 2017-03-29

## 2017-03-29 VITALS
DIASTOLIC BLOOD PRESSURE: 80 MMHG | OXYGEN SATURATION: 98 % | TEMPERATURE: 98.3 F | WEIGHT: 293 LBS | HEART RATE: 86 BPM | SYSTOLIC BLOOD PRESSURE: 100 MMHG | BODY MASS INDEX: 52.86 KG/M2

## 2017-03-29 DIAGNOSIS — R05.9 COUGH: ICD-10-CM

## 2017-03-29 DIAGNOSIS — J02.9 SORE THROAT: Primary | ICD-10-CM

## 2017-03-29 DIAGNOSIS — J06.9 VIRAL URI WITH COUGH: ICD-10-CM

## 2017-03-29 LAB
EXPIRATION DATE: NORMAL
EXPIRATION DATE: NORMAL
FLUAV AG NPH QL: NEGATIVE
FLUBV AG NPH QL: NEGATIVE
INTERNAL CONTROL: NORMAL
INTERNAL CONTROL: NORMAL
Lab: NORMAL
Lab: NORMAL
S PYO AG THROAT QL: NEGATIVE

## 2017-03-29 PROCEDURE — 99213 OFFICE O/P EST LOW 20 MIN: CPT | Performed by: INTERNAL MEDICINE

## 2017-03-29 PROCEDURE — 87804 INFLUENZA ASSAY W/OPTIC: CPT | Performed by: INTERNAL MEDICINE

## 2017-03-29 PROCEDURE — 87880 STREP A ASSAY W/OPTIC: CPT | Performed by: INTERNAL MEDICINE

## 2017-03-29 RX ORDER — LEVOTHYROXINE SODIUM 0.05 MG/1
50 TABLET ORAL DAILY
Qty: 90 TABLET | Refills: 0 | Status: ON HOLD | OUTPATIENT
Start: 2017-03-29 | End: 2018-11-13

## 2017-03-29 NOTE — PROGRESS NOTES
Subjective   Norma Bee is a 36 y.o. female.   Chief Complaint   Patient presents with   • Cough       History of Present Illness   Cough and sore throat since Sunday.  No vomiting or diarrhea.  No abdominal pain.   The following portions of the patient's history were reviewed and updated as appropriate: allergies, current medications, past family history, past medical history, past social history, past surgical history and problem list.    Review of Systems   Constitutional: Negative for activity change, appetite change, chills, diaphoresis, fatigue, fever and unexpected weight change.   HENT: Positive for sore throat. Negative for congestion, ear discharge, ear pain, mouth sores, nosebleeds, sinus pressure and sneezing.    Eyes: Negative for pain, discharge and itching.   Respiratory: Positive for cough. Negative for chest tightness, shortness of breath and wheezing.    Cardiovascular: Negative for chest pain, palpitations and leg swelling.   Gastrointestinal: Negative for abdominal pain, constipation, diarrhea, nausea and vomiting.   Endocrine: Negative for cold intolerance, heat intolerance, polydipsia and polyphagia.   Genitourinary: Negative for dysuria, flank pain, frequency, hematuria and urgency.   Musculoskeletal: Negative for arthralgias, back pain, gait problem, myalgias, neck pain and neck stiffness.   Skin: Negative for color change, pallor and rash.   Neurological: Negative for seizures, speech difficulty, numbness and headaches.   Psychiatric/Behavioral: Negative for agitation, confusion, decreased concentration and sleep disturbance. The patient is not nervous/anxious.      /80  Pulse 86  Temp 98.3 °F (36.8 °C)  Wt (!) 327 lb 8 oz (149 kg)  SpO2 98%  BMI 52.86 kg/m2    Objective   Physical Exam   Constitutional: She appears well-developed.   HENT:   Head: Normocephalic.   Right Ear: External ear normal.   Left Ear: External ear normal.   Nose: Nose normal.   Mouth/Throat:  Oropharynx is clear and moist.   Eyes: Conjunctivae are normal. Pupils are equal, round, and reactive to light.   Neck: No JVD present. No thyromegaly present.   Cardiovascular: Normal rate, regular rhythm and normal heart sounds.  Exam reveals no friction rub.    No murmur heard.  Pulmonary/Chest: Effort normal and breath sounds normal. No respiratory distress. She has no wheezes. She has no rales.   Abdominal: Soft. Bowel sounds are normal. She exhibits no distension. There is no tenderness. There is no guarding.   Musculoskeletal: She exhibits no edema or tenderness.   Lymphadenopathy:     She has no cervical adenopathy.   Neurological: She displays normal reflexes. No cranial nerve deficit.   Skin: No rash noted.   Psychiatric: Her behavior is normal.   Nursing note and vitals reviewed.      Assessment/Plan   Norma was seen today for cough.    Diagnoses and all orders for this visit:    Sore throat  -     POCT rapid strep A    Cough  -     POCT Influenza A/B    Viral URI with cough  Fluids, rest  Other orders  -     levothyroxine (SYNTHROID, LEVOTHROID) 50 MCG tablet; Take 1 tablet by mouth Daily.      Flu a and b negative.  Is seeing a therapist at [facility] for her depression

## 2017-04-20 ENCOUNTER — OFFICE VISIT (OUTPATIENT)
Dept: INTERNAL MEDICINE | Facility: CLINIC | Age: 37
End: 2017-04-20

## 2017-04-20 VITALS
OXYGEN SATURATION: 99 % | WEIGHT: 293 LBS | HEART RATE: 78 BPM | BODY MASS INDEX: 52.57 KG/M2 | DIASTOLIC BLOOD PRESSURE: 80 MMHG | SYSTOLIC BLOOD PRESSURE: 110 MMHG

## 2017-04-20 DIAGNOSIS — M25.571 ACUTE RIGHT ANKLE PAIN: Primary | ICD-10-CM

## 2017-04-20 PROCEDURE — 99213 OFFICE O/P EST LOW 20 MIN: CPT | Performed by: INTERNAL MEDICINE

## 2017-04-20 NOTE — PROGRESS NOTES
Subjective   Norma Bee is a 36 y.o. female.   Chief Complaint   Patient presents with   • Ankle Injury       History of Present Illness   Right ankle injury happened 11 days ago.  She was walking and twisted when stepping in manhole.  Pain continues.   The following portions of the patient's history were reviewed and updated as appropriate: allergies, current medications, past family history, past medical history, past social history, past surgical history and problem list.    Review of Systems   Constitutional: Negative for activity change, appetite change, chills, diaphoresis, fatigue, fever and unexpected weight change.   HENT: Negative for congestion, ear discharge, ear pain, mouth sores, nosebleeds, sinus pressure, sneezing and sore throat.    Eyes: Negative for pain, discharge and itching.   Respiratory: Negative for cough, chest tightness, shortness of breath and wheezing.    Cardiovascular: Negative for chest pain, palpitations and leg swelling.   Gastrointestinal: Negative for abdominal pain, constipation, diarrhea, nausea and vomiting.   Endocrine: Negative for cold intolerance, heat intolerance, polydipsia and polyphagia.   Genitourinary: Negative for dysuria, flank pain, frequency, hematuria and urgency.   Musculoskeletal: Negative for arthralgias, back pain, gait problem, myalgias, neck pain and neck stiffness.   Skin: Negative for color change, pallor and rash.   Neurological: Negative for seizures, speech difficulty, numbness and headaches.   Psychiatric/Behavioral: Negative for agitation, confusion, decreased concentration and sleep disturbance. The patient is not nervous/anxious.    /80  Pulse 78  Wt (!) 325 lb 11.2 oz (148 kg)  SpO2 99%  BMI 52.57 kg/m2      Objective   Physical Exam   Constitutional: She appears well-developed.   HENT:   Head: Normocephalic.   Right Ear: External ear normal.   Left Ear: External ear normal.   Nose: Nose normal.   Mouth/Throat: Oropharynx is  clear and moist.   Eyes: Conjunctivae are normal. Pupils are equal, round, and reactive to light.   Neck: No JVD present. No thyromegaly present.   Cardiovascular: Normal rate, regular rhythm and normal heart sounds.  Exam reveals no friction rub.    No murmur heard.  Pulmonary/Chest: Effort normal and breath sounds normal. No respiratory distress. She has no wheezes. She has no rales.   Abdominal: Soft. Bowel sounds are normal. She exhibits no distension. There is no tenderness. There is no guarding.   Musculoskeletal: She exhibits no edema or tenderness.   Lymphadenopathy:     She has no cervical adenopathy.   Neurological: She displays normal reflexes. No cranial nerve deficit.   Skin: No rash noted.   Psychiatric: She has a normal mood and affect. Her behavior is normal.   Nursing note and vitals reviewed.      Assessment/Plan   Norma was seen today for ankle injury.    Diagnoses and all orders for this visit:    Acute right ankle pain  -     XR Ankle 3+ View Right; Future  took ibuprofen for 1st few days. Further rec to follow.

## 2017-05-24 ENCOUNTER — CONSULT (OUTPATIENT)
Dept: SLEEP MEDICINE | Facility: HOSPITAL | Age: 37
End: 2017-05-24

## 2017-05-24 VITALS
HEIGHT: 66 IN | SYSTOLIC BLOOD PRESSURE: 108 MMHG | OXYGEN SATURATION: 89 % | BODY MASS INDEX: 47.09 KG/M2 | HEART RATE: 80 BPM | WEIGHT: 293 LBS | DIASTOLIC BLOOD PRESSURE: 74 MMHG

## 2017-05-24 DIAGNOSIS — J35.1 TONSILLAR HYPERTROPHY: ICD-10-CM

## 2017-05-24 DIAGNOSIS — R06.83 SNORING: Primary | ICD-10-CM

## 2017-05-24 DIAGNOSIS — G47.33 OBSTRUCTIVE SLEEP APNEA, ADULT: ICD-10-CM

## 2017-05-24 PROBLEM — E66.01 MORBID OBESITY DUE TO EXCESS CALORIES (HCC): Status: ACTIVE | Noted: 2017-05-24

## 2017-05-24 PROCEDURE — 99204 OFFICE O/P NEW MOD 45 MIN: CPT | Performed by: INTERNAL MEDICINE

## 2017-06-06 ENCOUNTER — HOSPITAL ENCOUNTER (OUTPATIENT)
Dept: SLEEP MEDICINE | Facility: HOSPITAL | Age: 37
Discharge: HOME | End: 2017-06-06
Attending: INTERNAL MEDICINE | Admitting: INTERNAL MEDICINE

## 2017-06-06 VITALS
OXYGEN SATURATION: 97 % | DIASTOLIC BLOOD PRESSURE: 72 MMHG | BODY MASS INDEX: 47.09 KG/M2 | SYSTOLIC BLOOD PRESSURE: 121 MMHG | HEART RATE: 90 BPM | WEIGHT: 293 LBS | HEIGHT: 66 IN

## 2017-06-06 DIAGNOSIS — R06.83 SNORING: ICD-10-CM

## 2017-06-06 DIAGNOSIS — G47.33 OBSTRUCTIVE SLEEP APNEA, ADULT: ICD-10-CM

## 2017-06-06 PROCEDURE — 95800 SLP STDY UNATTENDED: CPT | Performed by: INTERNAL MEDICINE

## 2017-06-06 PROCEDURE — 95800 SLP STDY UNATTENDED: CPT

## 2017-06-07 DIAGNOSIS — G47.19 EXCESSIVE DAYTIME SLEEPINESS: ICD-10-CM

## 2017-06-07 DIAGNOSIS — F43.10 PTSD (POST-TRAUMATIC STRESS DISORDER): ICD-10-CM

## 2017-06-07 DIAGNOSIS — G47.33 OBSTRUCTIVE SLEEP APNEA, ADULT: Primary | ICD-10-CM

## 2017-06-07 DIAGNOSIS — R06.83 SNORING: ICD-10-CM

## 2017-06-19 NOTE — PROGRESS NOTES
Patient called stating the order was just for new supplies and someone had already sent her information over to [facility]. I faxed over the order for supplies to [facility].

## 2017-07-21 ENCOUNTER — OFFICE VISIT (OUTPATIENT)
Dept: RETAIL CLINIC | Facility: CLINIC | Age: 37
End: 2017-07-21

## 2017-07-21 VITALS
OXYGEN SATURATION: 98 % | HEART RATE: 88 BPM | WEIGHT: 293 LBS | HEIGHT: 66 IN | BODY MASS INDEX: 47.09 KG/M2 | TEMPERATURE: 98 F | RESPIRATION RATE: 20 BRPM

## 2017-07-21 DIAGNOSIS — H93.8X1 SENSATION OF FULLNESS IN EAR, RIGHT: Primary | ICD-10-CM

## 2017-07-21 DIAGNOSIS — T16.1XXA FOREIGN BODY IN EAR, RIGHT, INITIAL ENCOUNTER: ICD-10-CM

## 2017-07-21 DIAGNOSIS — H61.20 IMPACTED CERUMEN, UNSPECIFIED LATERALITY: ICD-10-CM

## 2017-07-21 PROCEDURE — 99213 OFFICE O/P EST LOW 20 MIN: CPT | Performed by: NURSE PRACTITIONER

## 2017-07-21 PROCEDURE — 69209 REMOVE IMPACTED EAR WAX UNI: CPT | Performed by: NURSE PRACTITIONER

## 2017-07-21 NOTE — PROGRESS NOTES
Subjective   Norma Bee is a 36 y.o. female presents with right ear fullness and feels like ear is blocked.  States is having ear discomfort, decreased hearing and occasional dizziness.  Patient states that she went to [facility] yesterday and had ear coning procedure on both ears.  States felt very hot in right ear during procedure and that lady irrigated her ear with water and peroxide but told her there was a lot of debris that she could not get out.  States that the lady told her to put peroxide and alcohol in ear but that it continues to get worse.  States mild pain or discomfort inside right ear, denies drainage.  States no problems with left ear.     History of Present Illness     The following portions of the patient's history were reviewed and updated as appropriate: allergies, current medications, past family history, past medical history, past social history and past surgical history.    Review of Systems   Constitutional: Negative for appetite change, chills, fever and unexpected weight change.   HENT: Positive for ear pain (right ear discomfort and fullness) and hearing loss (decreased hearing in right ear). Negative for congestion, ear discharge, facial swelling, sore throat, trouble swallowing and voice change.    Eyes: Negative.    Respiratory: Negative.    Cardiovascular: Negative.    Gastrointestinal: Negative.    Genitourinary: Negative.    Musculoskeletal: Negative.    Skin: Negative.    Neurological: Positive for dizziness (occasional dizziness). Negative for syncope and weakness.       Objective   Physical Exam   Constitutional: She is oriented to person, place, and time. She appears well-developed and well-nourished. No distress.   HENT:   Head: Normocephalic and atraumatic.   Right Ear: There is tenderness. No drainage or swelling. A foreign body (unable to see TM due to yellow to dark brown material in ear unsure whether candle wax or cerumen, beginning of ear canal with mild  erythema noted) is present. Decreased hearing is noted.   Left Ear: No drainage, swelling or tenderness. A foreign body (unable to see TM due to yellowish brown material ) is present. No decreased hearing is noted.   Nose: Nose normal.   Mouth/Throat: Uvula is midline, oropharynx is clear and moist and mucous membranes are normal. Tonsils are 0 on the right. Tonsils are 0 on the left. No tonsillar exudate.   Teaching done with patient on ear irrigation, verbal consent obtained.  Placed debrox drops in bilateral ears.  Attempted to irrigate right ear with warm water and peroxide.  After irrigating a small amount with no success patient stated becoming a little dizzy.  Irrigation stopped. No further attempts made.  Patient states dizziness stopped. (Will send to ENT)   Eyes: Conjunctivae and lids are normal. Pupils are equal, round, and reactive to light.   Neck: Normal range of motion.   Cardiovascular: Normal rate, regular rhythm and normal heart sounds.    No murmur heard.  Pulmonary/Chest: Effort normal and breath sounds normal. No respiratory distress.   Lymphadenopathy:     She has no cervical adenopathy.   Neurological: She is alert and oriented to person, place, and time.   Skin: Skin is warm and dry.   Psychiatric: She has a normal mood and affect. Her speech is normal and behavior is normal. Thought content normal.       Assessment/Plan   Norma was seen today for ear fullness.    Diagnoses and all orders for this visit:    Sensation of fullness in ear, right    Foreign body in ear, right, initial encounter    Impacted cerumen, unspecified laterality    No medications prescribed in clinic.  Called and arranged appointment with Dr. Gigi LAKE for today.  Instructed patient not to put anything in ears until she sees ENT doctor.  Instructed to go to appointment with ENT and if worse go to urgent care or ER as discussed.  Patient states will go to appointment with ENT.  Denies dizziness and states able to  drive.  Left clinic stable to go to ENT appointment.    XAVIER Pradhan

## 2017-07-26 ENCOUNTER — ANESTHESIA EVENT (OUTPATIENT)
Dept: PERIOP | Facility: HOSPITAL | Age: 37
End: 2017-07-26

## 2017-07-27 ENCOUNTER — HOSPITAL ENCOUNTER (OUTPATIENT)
Facility: HOSPITAL | Age: 37
Discharge: HOME | End: 2017-07-27
Attending: OTOLARYNGOLOGY | Admitting: OTOLARYNGOLOGY

## 2017-07-27 ENCOUNTER — ANESTHESIA (OUTPATIENT)
Dept: PERIOP | Facility: HOSPITAL | Age: 37
End: 2017-07-27

## 2017-07-27 VITALS
DIASTOLIC BLOOD PRESSURE: 76 MMHG | WEIGHT: 293 LBS | HEART RATE: 86 BPM | TEMPERATURE: 98 F | RESPIRATION RATE: 16 BRPM | OXYGEN SATURATION: 98 % | SYSTOLIC BLOOD PRESSURE: 122 MMHG | BODY MASS INDEX: 47.09 KG/M2 | HEIGHT: 66 IN

## 2017-07-27 PROBLEM — H61.21 CERUMEN DEBRIS ON TYMPANIC MEMBRANE OF RIGHT EAR: Status: ACTIVE | Noted: 2017-07-27

## 2017-07-27 LAB
BASOPHILS # BLD AUTO: 0.03 10*3/MM3 (ref 0–0.2)
BASOPHILS NFR BLD AUTO: 0.3 % (ref 0–1)
DEPRECATED RDW RBC AUTO: 46.4 FL (ref 37–54)
EOSINOPHIL # BLD AUTO: 0.13 10*3/MM3 (ref 0–0.3)
EOSINOPHIL NFR BLD AUTO: 1.3 % (ref 0–3)
ERYTHROCYTE [DISTWIDTH] IN BLOOD BY AUTOMATED COUNT: 15.5 % (ref 11.3–14.5)
HCT VFR BLD AUTO: 37.9 % (ref 34.5–44)
HGB BLD-MCNC: 12.3 G/DL (ref 11.5–15.5)
IMM GRANULOCYTES # BLD: 0.06 10*3/MM3 (ref 0–0.03)
IMM GRANULOCYTES NFR BLD: 0.6 % (ref 0–0.6)
LYMPHOCYTES # BLD AUTO: 1.32 10*3/MM3 (ref 0.6–4.8)
LYMPHOCYTES NFR BLD AUTO: 13.7 % (ref 24–44)
MCH RBC QN AUTO: 26.8 PG (ref 27–31)
MCHC RBC AUTO-ENTMCNC: 32.5 G/DL (ref 32–36)
MCV RBC AUTO: 82.6 FL (ref 80–99)
MONOCYTES # BLD AUTO: 0.72 10*3/MM3 (ref 0–1)
MONOCYTES NFR BLD AUTO: 7.5 % (ref 0–12)
NEUTROPHILS # BLD AUTO: 7.39 10*3/MM3 (ref 1.5–8.3)
NEUTROPHILS NFR BLD AUTO: 76.6 % (ref 41–71)
PLATELET # BLD AUTO: 267 10*3/MM3 (ref 150–450)
PMV BLD AUTO: 9.3 FL (ref 6–12)
RBC # BLD AUTO: 4.59 10*6/MM3 (ref 3.89–5.14)
WBC NRBC COR # BLD: 9.65 10*3/MM3 (ref 3.5–10.8)

## 2017-07-27 PROCEDURE — 25010000002 ONDANSETRON PER 1 MG: Performed by: NURSE ANESTHETIST, CERTIFIED REGISTERED

## 2017-07-27 PROCEDURE — 25010000002 EPINEPHRINE PER 0.1 MG: Performed by: OTOLARYNGOLOGY

## 2017-07-27 PROCEDURE — 25010000002 PROPOFOL 10 MG/ML EMULSION: Performed by: NURSE ANESTHETIST, CERTIFIED REGISTERED

## 2017-07-27 PROCEDURE — 85025 COMPLETE CBC W/AUTO DIFF WBC: CPT | Performed by: ANESTHESIOLOGY

## 2017-07-27 RX ORDER — SODIUM CHLORIDE 0.9 % (FLUSH) 0.9 %
1-10 SYRINGE (ML) INJECTION AS NEEDED
Status: DISCONTINUED | OUTPATIENT
Start: 2017-07-27 | End: 2017-07-27 | Stop reason: HOSPADM

## 2017-07-27 RX ORDER — FAMOTIDINE 10 MG/ML
20 INJECTION, SOLUTION INTRAVENOUS ONCE
Status: DISCONTINUED | OUTPATIENT
Start: 2017-07-27 | End: 2017-07-27

## 2017-07-27 RX ORDER — FAMOTIDINE 20 MG/1
20 TABLET, FILM COATED ORAL ONCE
Status: COMPLETED | OUTPATIENT
Start: 2017-07-27 | End: 2017-07-27

## 2017-07-27 RX ORDER — LIDOCAINE HYDROCHLORIDE 10 MG/ML
0.5 INJECTION, SOLUTION EPIDURAL; INFILTRATION; INTRACAUDAL; PERINEURAL ONCE AS NEEDED
Status: COMPLETED | OUTPATIENT
Start: 2017-07-27 | End: 2017-07-27

## 2017-07-27 RX ORDER — LIDOCAINE HYDROCHLORIDE 10 MG/ML
INJECTION, SOLUTION EPIDURAL; INFILTRATION; INTRACAUDAL; PERINEURAL AS NEEDED
Status: DISCONTINUED | OUTPATIENT
Start: 2017-07-27 | End: 2017-07-27 | Stop reason: SURG

## 2017-07-27 RX ORDER — SODIUM CHLORIDE, SODIUM LACTATE, POTASSIUM CHLORIDE, CALCIUM CHLORIDE 600; 310; 30; 20 MG/100ML; MG/100ML; MG/100ML; MG/100ML
9 INJECTION, SOLUTION INTRAVENOUS CONTINUOUS
Status: DISCONTINUED | OUTPATIENT
Start: 2017-07-27 | End: 2017-07-28 | Stop reason: HOSPADM

## 2017-07-27 RX ORDER — PROPOFOL 10 MG/ML
VIAL (ML) INTRAVENOUS AS NEEDED
Status: DISCONTINUED | OUTPATIENT
Start: 2017-07-27 | End: 2017-07-27 | Stop reason: SURG

## 2017-07-27 RX ORDER — ONDANSETRON 2 MG/ML
INJECTION INTRAMUSCULAR; INTRAVENOUS AS NEEDED
Status: DISCONTINUED | OUTPATIENT
Start: 2017-07-27 | End: 2017-07-27 | Stop reason: SURG

## 2017-07-27 RX ADMIN — PROPOFOL 300 MG: 10 INJECTION, EMULSION INTRAVENOUS at 13:37

## 2017-07-27 RX ADMIN — SODIUM CHLORIDE, POTASSIUM CHLORIDE, SODIUM LACTATE AND CALCIUM CHLORIDE 9 ML/HR: 600; 310; 30; 20 INJECTION, SOLUTION INTRAVENOUS at 12:30

## 2017-07-27 RX ADMIN — LIDOCAINE HYDROCHLORIDE 100 MG: 10 INJECTION, SOLUTION EPIDURAL; INFILTRATION; INTRACAUDAL; PERINEURAL at 13:37

## 2017-07-27 RX ADMIN — ONDANSETRON 4 MG: 2 INJECTION INTRAMUSCULAR; INTRAVENOUS at 13:55

## 2017-07-27 RX ADMIN — LIDOCAINE HYDROCHLORIDE 0.2 ML: 10 INJECTION, SOLUTION EPIDURAL; INFILTRATION; INTRACAUDAL; PERINEURAL at 12:30

## 2017-07-27 RX ADMIN — FAMOTIDINE 20 MG: 20 TABLET ORAL at 12:44

## 2017-07-27 NOTE — ANESTHESIA POSTPROCEDURE EVALUATION
Patient: Norma Bee    Procedure Summary     Date Anesthesia Start Anesthesia Stop Room / Location    07/27/17 1328 1409 [facility] 04 / [facility]       Procedure Diagnosis Surgeon Provider    RIGHT REMOVAL FOREIGN BODY EAR CANAL (Right Ear) No diagnosis on file. MD Jose Juan Madison MD          Anesthesia Type: general  Last vitals  BP        Temp        Pulse       Resp        SpO2          Post Anesthesia Care and Evaluation    Patient location during evaluation: PACU  Patient participation: complete - patient participated  Level of consciousness: awake and alert  Pain score: 0  Pain management: adequate  Airway patency: patent  Anesthetic complications: No anesthetic complications  PONV Status: none  Cardiovascular status: hemodynamically stable and acceptable  Respiratory status: nonlabored ventilation, acceptable and nasal cannula  Hydration status: acceptable

## 2017-07-27 NOTE — ANESTHESIA PROCEDURE NOTES
Airway  Urgency: elective    Airway not difficult    General Information and Staff    Patient location during procedure: OR  CRNA: XIOMARA HUNT    Indications and Patient Condition  Indications for airway management: airway protection    Preoxygenated: yes  MILS maintained throughout  Mask difficulty assessment: 1 - vent by mask    Final Airway Details  Final airway type: supraglottic airway      Successful airway: unique  Size 4    Number of attempts at approach: 1    Additional Comments  Pt to OR 4 . Pt moved self to OR table. ASA monitors applied. Pre-O2 with 100%. SIVI. LMA placed atraumatic. +ETCO2. +BBS.

## 2017-07-27 NOTE — ANESTHESIA PREPROCEDURE EVALUATION
Anesthesia Evaluation     NPO Solid Status: > 8 hours  NPO Liquid Status: > 8 hours     Airway   Mallampati: II  TM distance: >3 FB  Neck ROM: full  possible difficult intubation  Dental - normal exam     Pulmonary - normal exam   (+) sleep apnea,   (-) asthma, not a smoker  Cardiovascular     Rhythm: regular  Rate: normal    (-) hypertension, angina, murmur, cardiac stents      Neuro/Psych  (+) seizures (seizure 6 yeas ago associated with chemo.  None since) well controlled,    GI/Hepatic/Renal/Endo    (+) morbid obesity,   (-) GERD, no renal disease, diabetesLiver disease: fatty liver.    Musculoskeletal     Abdominal    Substance History      OB/GYN          Other                                        Anesthesia Plan    ASA 3     general   (GA, mask)    Plan discussed with CRNA.

## 2017-08-04 ENCOUNTER — OFFICE VISIT (OUTPATIENT)
Dept: SLEEP MEDICINE | Facility: HOSPITAL | Age: 37
End: 2017-08-04

## 2017-08-04 VITALS
HEART RATE: 86 BPM | BODY MASS INDEX: 47.09 KG/M2 | OXYGEN SATURATION: 97 % | HEIGHT: 66 IN | WEIGHT: 293 LBS | DIASTOLIC BLOOD PRESSURE: 82 MMHG | SYSTOLIC BLOOD PRESSURE: 140 MMHG

## 2017-08-04 DIAGNOSIS — G47.33 OBSTRUCTIVE SLEEP APNEA, ADULT: Primary | ICD-10-CM

## 2017-08-04 PROCEDURE — 99214 OFFICE O/P EST MOD 30 MIN: CPT | Performed by: INTERNAL MEDICINE

## 2017-08-04 NOTE — PROGRESS NOTES
Subjective   Norma Bee is a 36 y.o. female is here today for follow-up.  She is seen here for snoring and nonrestorative sleep.  Her primary care physician is Dr. Aye Ventura    History of Present Illness  Patient was seen here May 24 for history of snoring and nonrestorative sleep.  She has a history of endometrial cancer.  She has history of PTSD morbid obesity and hypothyroidism.  She had home sleep testing on June 6.  She thought it study night was fairly usual night.  She is now been on CPAP.  She says she's feels about the same.  She had surgery last week on her ear.  Past Medical History:   Diagnosis Date   • Disease of thyroid gland    • Endometrial cancer     follows with gyn onc at    • Fatty liver    • Irritation of right eye 04/07/2016    she will see optho today for exam  Aye Ventura (Internal Medicine)   • Leukocytosis 11/10/2015    repreat in 2 weeks  Aye Ventura (Internal Medicine)   • Lumbar back pain     L5-S1   • Ovarian cancer 10/26/2015    followed by gyn/onc   Aye Ventura (Internal Medicine)   • PTSD (post-traumatic stress disorder)    • RUQ pain 10/26/2015     u/s  Aye Ventura (Internal Medicine)   • Thrombocytosis        Past Surgical History:   Procedure Laterality Date   • FOREIGN BODY REMOVAL Right 7/27/2017    Procedure: RIGHT REMOVAL FOREIGN BODY EAR CANAL;  Surgeon: Frankie Yu MD;  Location: [facility];  Service:    • HYSTERECTOMY     • INNER EAR SURGERY     • LAPAROTOMY OOPHERECTOMY Right    • LEFT OOPHORECTOMY             Current Outpatient Prescriptions:   •  cholecalciferol (VITAMIN D3) 1000 UNITS tablet, Take 1,000 Units by mouth Daily., Disp: , Rfl:   •  FREESTYLE LITE test strip, USE ONE STRIP TO TEST DAILY, Disp: 100 each, Rfl: 0  •  Lancets (FREESTYLE) lancets, , Disp: , Rfl:   •  levothyroxine (SYNTHROID, LEVOTHROID) 50 MCG tablet, Take 1 tablet by mouth Daily., Disp: 90 tablet, Rfl: 0  •  Multiple Vitamins-Minerals  "(MULTI-VITAMIN GUMMIES PO), Take  by mouth daily., Disp: , Rfl:   •  Omega-3 Fatty Acids (FISH OIL) 1000 MG capsule capsule, Take  by mouth daily with breakfast., Disp: , Rfl:     No Known Allergies    The following portions of the patient's history were reviewed and updated as appropriate: allergies, current medications and problem list.    Review of Systems   Constitutional: Negative.    HENT: Positive for ear pain and sinus pressure.    Eyes: Positive for redness.   Respiratory: Positive for cough.    Cardiovascular: Negative.    Gastrointestinal: Negative.    Endocrine: Negative.    Genitourinary: Negative.    Musculoskeletal: Positive for arthralgias and joint swelling.   Skin: Positive for rash.   Allergic/Immunologic: Negative.    Neurological: Positive for headaches.   Hematological: Negative.    Psychiatric/Behavioral: Positive for dysphoric mood. The patient is nervous/anxious.    [place] scores 2/24    Objective     /82  Pulse 86  Ht 66\" (167.6 cm)  Wt (!) 336 lb (152 kg)  SpO2 97%  BMI 54.23 kg/m2    Physical Exam   Constitutional: She is oriented to person, place, and time. She appears well-developed and well-nourished.   She is morbidly obese.   HENT:   Head: Normocephalic and atraumatic.   She has Mallampati class III anatomy   Eyes: EOM are normal. Pupils are equal, round, and reactive to light.   Neck: Normal range of motion. Neck supple.   Cardiovascular: Normal rate, regular rhythm and normal heart sounds.    Pulmonary/Chest: Effort normal and breath sounds normal.   Abdominal: Soft. Bowel sounds are normal.   Musculoskeletal: Normal range of motion. She exhibits no edema.   Neurological: She is alert and oriented to person, place, and time.   Skin: Skin is warm and dry.   Psychiatric: She has a normal mood and affect. Her behavior is normal.     Home sleep study on June 6 showed overall an AHI of 16.8.  This is consistent with moderate obstructive sleep apnea.    Download from her " machine for the past 6 months shows usage 96.7% the time.  She is using it 8 hours 3 minutes per day.  Her 90% pressure is 9.2.  Her AHI is normal at 0.6.    Assessment/Plan   Norma was seen today for follow-up.    Diagnoses and all orders for this visit:    Obstructive sleep apnea, adult  -     CPAP Therapy    Patient had a home sleep study data requalify her with her insurance.  She is continuing with her machine.  She states doing well with her machine.  We will continue on her current pressure settings.  We will renew her supplies.  We will plan to see her back in 1 year.  She is to contact us earlier symptoms worsen.  She is encouraged to lose weight.  She is encouraged to avoid alcohol and sedatives close to bedtime.  She is encouraged to practice lateral position sleep.             Cleve Hernandez MD [facility]  Sleep Medicine  Pulmonary and Critical Care Medicine      08/04/17  8:55 AM

## 2017-08-04 NOTE — PATIENT INSTRUCTIONS
Sleep Apnea  Sleep apnea is a condition in which breathing pauses or becomes shallow during sleep. Episodes of sleep apnea usually last 10 seconds or longer, and they may occur as many as 20 times an hour. Sleep apnea disrupts your sleep and keeps your body from getting the rest that it needs. This condition can increase your risk of certain health problems, including:  · Heart attack.  · Stroke.  · Obesity.  · Diabetes.  · Heart failure.  · Irregular heartbeat.  There are three kinds of sleep apnea:  · Obstructive sleep apnea. This kind is caused by a blocked or collapsed airway.  · Central sleep apnea. This kind happens when the part of the brain that controls breathing does not send the correct signals to the muscles that control breathing.  · Mixed sleep apnea. This is a combination of obstructive and central sleep apnea.  CAUSES  The most common cause of this condition is a collapsed or blocked airway. An airway can collapse or become blocked if:  · Your throat muscles are abnormally relaxed.  · Your tongue and tonsils are larger than normal.  · You are overweight.  · Your airway is smaller than normal.  RISK FACTORS  This condition is more likely to develop in people who:  · Are overweight.  · Smoke.  · Have a smaller than normal airway.  · Are elderly.  · Are male.  · Drink alcohol.  · Take sedatives or tranquilizers.  · Have a family history of sleep apnea.  SYMPTOMS  Symptoms of this condition include:  · Trouble staying asleep.  · Daytime sleepiness and tiredness.  · Irritability.  · Loud snoring.  · Morning headaches.  · Trouble concentrating.  · Forgetfulness.  · Decreased interest in sex.  · Unexplained sleepiness.  · Mood swings.  · Personality changes.  · Feelings of depression.  · Waking up often during the night to urinate.  · Dry mouth.  · Sore throat.  DIAGNOSIS  This condition may be diagnosed with:  · A medical history.  · A physical exam.  · A series of tests that are done while you are  sleeping (sleep study). These tests are usually done in a sleep lab, but they may also be done at home.  TREATMENT  Treatment for this condition aims to restore normal breathing and to ease symptoms during sleep. It may involve managing health issues that can affect breathing, such as high blood pressure or obesity. Treatment may include:  · Sleeping on your side.  · Using a decongestant if you have nasal congestion.  · Avoiding the use of depressants, including alcohol, sedatives, and narcotics.  · Losing weight if you are overweight.  · Making changes to your diet.  · Quitting smoking.  · Using a device to open your airway while you sleep, such as:    An oral appliance. This is a custom-made mouthpiece that shifts your lower jaw forward.    A continuous positive airway pressure (CPAP) device. This device delivers oxygen to your airway through a mask.    A nasal expiratory positive airway pressure (EPAP) device. This device has valves that you put into each nostril.    A bi-level positive airway pressure (BPAP) device. This device delivers oxygen to your airway through a mask.  · Surgery if other treatments do not work. During surgery, excess tissue is removed to create a wider airway.  It is important to get treatment for sleep apnea. Without treatment, this condition can lead to:  · High blood pressure.  · Coronary artery disease.  · (Men) An inability to achieve or maintain an erection (impotence).  · Reduced thinking abilities.  HOME CARE INSTRUCTIONS  · Make any lifestyle changes that your health care provider recommends.  · Eat a healthy, well-balanced diet.  · Take over-the-counter and prescription medicines only as told by your health care provider.  · Avoid using depressants, including alcohol, sedatives, and narcotics.  · Take steps to lose weight if you are overweight.  · If you were given a device to open your airway while you sleep, use it only as told by your health care provider.  · Do not use any  tobacco products, such as cigarettes, chewing tobacco, and e-cigarettes. If you need help quitting, ask your health care provider.  · Keep all follow-up visits as told by your health care provider. This is important.  SEEK MEDICAL CARE IF:  · The device that you received to open your airway during sleep is uncomfortable or does not seem to be working.  · Your symptoms do not improve.  · Your symptoms get worse.  SEEK IMMEDIATE MEDICAL CARE IF:  · You develop chest pain.  · You develop shortness of breath.  · You develop discomfort in your back, arms, or stomach.  · You have trouble speaking.  · You have weakness on one side of your body.  · You have drooping in your face.  These symptoms may represent a serious problem that is an emergency. Do not wait to see if the symptoms will go away. Get medical help right away. Call your local emergency services (911 in the [place] Do not drive yourself to the hospital.     This information is not intended to replace advice given to you by your health care provider. Make sure you discuss any questions you have with your health care provider.     Document Released: 12/08/2003 Document Revised: 04/10/2017 Document Reviewed: 09/26/2016  [employer] Patient Education ©2017 [employer]

## 2017-09-13 ENCOUNTER — OFFICE VISIT (OUTPATIENT)
Dept: GASTROENTEROLOGY | Facility: CLINIC | Age: 37
End: 2017-09-13

## 2017-09-13 VITALS
HEIGHT: 66 IN | SYSTOLIC BLOOD PRESSURE: 118 MMHG | WEIGHT: 293 LBS | OXYGEN SATURATION: 97 % | BODY MASS INDEX: 47.09 KG/M2 | HEART RATE: 80 BPM | DIASTOLIC BLOOD PRESSURE: 76 MMHG | TEMPERATURE: 97 F

## 2017-09-13 DIAGNOSIS — K76.0 NON-ALCOHOLIC FATTY LIVER DISEASE: Primary | ICD-10-CM

## 2017-09-13 PROCEDURE — 99213 OFFICE O/P EST LOW 20 MIN: CPT | Performed by: INTERNAL MEDICINE

## 2017-09-13 NOTE — PROGRESS NOTES
PCP: Aye Ventura DO    Chief Complaint   Patient presents with   • Follow-up       History of Present Illness:   HPI  Ms. Bee returns for a follow-up visit.  The patient denies any upper abdominal pain with meals.  There is no history of nausea or vomiting.  She admits that her dietary habits have not been good lately.  She has not been able to lose any weight since the last office visit. Ms. Bee denies any signs of gastrointestinal bleeding.  There is no history of change in bowel habits.  Ms. Bee denies any night sweats, fever or chills.   Past Medical History:   Diagnosis Date   • Disease of thyroid gland    • Endometrial cancer     follows with gyn onc at    • Fatty liver    • Irritation of right eye 04/07/2016    she will see optho today for exam  Aye Ventura (Internal Medicine)   • Leukocytosis 11/10/2015    repreat in 2 weeks  Aye Ventura (Internal Medicine)   • Lumbar back pain     L5-S1   • Ovarian cancer 10/26/2015    followed by gyn/onc   Aye Ventura (Internal Medicine)   • PTSD (post-traumatic stress disorder)    • RUQ pain 10/26/2015     u/s  Aye Ventura (Internal Medicine)   • Thrombocytosis        Past Surgical History:   Procedure Laterality Date   • FOREIGN BODY REMOVAL Right 7/27/2017    Procedure: RIGHT REMOVAL FOREIGN BODY EAR CANAL;  Surgeon: Frankie Yu MD;  Location: [facility];  Service:    • HYSTERECTOMY     • INNER EAR SURGERY     • LAPAROTOMY OOPHERECTOMY Right    • LEFT OOPHORECTOMY           Current Outpatient Prescriptions:   •  cholecalciferol (VITAMIN D3) 1000 UNITS tablet, Take 1,000 Units by mouth Daily., Disp: , Rfl:   •  FREESTYLE LITE test strip, USE ONE STRIP TO TEST DAILY, Disp: 100 each, Rfl: 0  •  Lancets (FREESTYLE) lancets, , Disp: , Rfl:   •  levothyroxine (SYNTHROID, LEVOTHROID) 50 MCG tablet, Take 1 tablet by mouth Daily., Disp: 90 tablet, Rfl: 0  •  Multiple Vitamins-Minerals (MULTI-VITAMIN GUMMIES PO), Take  by mouth  daily., Disp: , Rfl:   •  Omega-3 Fatty Acids (FISH OIL) 1000 MG capsule capsule, Take  by mouth daily with breakfast., Disp: , Rfl:     No Known Allergies    Family History   Problem Relation Age of Onset   • Arthritis Other      osteoarthritis   • Multiple myeloma Other    • Lung cancer Other    • Lymphoma Other      non-Hodgkin's lymphoma   • Thyroid disease Other    • Lymphoma Mother    • Alzheimer's disease Mother    • Thyroid disease Mother    • Diabetes Father    • Cirrhosis Paternal Grandfather        Social History     Social History   • Marital status: Single     Spouse name: N/A   • Number of children: N/A   • Years of education: N/A     Occupational History   • Not on file.     Social History Main Topics   • Smoking status: Never Smoker   • Smokeless tobacco: Never Used   • Alcohol use No      Comment: Rare   • Drug use: No   • Sexual activity: Not on file      Comment: Single      Other Topics Concern   • Not on file     Social History Narrative       Review of Systems   Constitutional: Negative for activity change, appetite change, fatigue, fever and unexpected weight change.   HENT: Negative for dental problem, hearing loss, mouth sores, postnasal drip, sneezing, trouble swallowing and voice change.    Eyes: Negative for pain, redness, itching and visual disturbance.   Respiratory: Negative for cough, choking, chest tightness, shortness of breath and wheezing.    Cardiovascular: Negative for chest pain, palpitations and leg swelling.   Gastrointestinal: Negative for abdominal distention, abdominal pain, anal bleeding, blood in stool, constipation, diarrhea, nausea, rectal pain and vomiting.   Endocrine: Negative for cold intolerance, heat intolerance, polydipsia, polyphagia and polyuria.   Genitourinary: Negative.  Negative for dysuria, enuresis, flank pain, hematuria and urgency.   Musculoskeletal: Negative for arthralgias, back pain, gait problem, joint swelling and myalgias.   Skin: Negative for  color change, pallor and rash.   Allergic/Immunologic: Negative for environmental allergies, food allergies and immunocompromised state.   Neurological: Negative for dizziness, tremors, seizures, facial asymmetry, speech difficulty, numbness and headaches.   Hematological: Negative for adenopathy.   Psychiatric/Behavioral: Negative for behavioral problems, confusion, dysphoric mood, hallucinations and self-injury.       Vitals:    09/13/17 0800   BP: 118/76   Pulse: 80   Temp: 97 °F (36.1 °C)   SpO2: 97%       Physical Exam   Constitutional: She is oriented to person, place, and time. No distress.   Morbid obesity   HENT:   Head: Normocephalic and atraumatic.   Mouth/Throat: Oropharynx is clear and moist. No oropharyngeal exudate.   Eyes: Conjunctivae and EOM are normal. No scleral icterus.   Neck: Normal range of motion. Neck supple.   Cardiovascular: Normal rate, regular rhythm and normal heart sounds.  Exam reveals no gallop.    No murmur heard.  Pulmonary/Chest: Effort normal and breath sounds normal. She has no wheezes. She has no rales.   Abdominal: Soft. Bowel sounds are normal. There is no tenderness. There is no guarding.   Large pannus   Musculoskeletal: Normal range of motion. She exhibits no edema or tenderness.   Lymphadenopathy:     She has no cervical adenopathy.   Neurological: She is alert and oriented to person, place, and time. She exhibits normal muscle tone.   Skin: Skin is dry. No rash noted. No erythema.   Psychiatric: She has a normal mood and affect. Her behavior is normal. Thought content normal.   Vitals reviewed.      Norma was seen today for follow-up.    Diagnoses and all orders for this visit:    Non-alcoholic fatty liver disease  -     Comprehensive Metabolic Panel  The patient has no clinical signs of chronic liver disease on exam.  She has not lost any weight since the last office visit.    Cholelithiasis  Morbid obesity      Plan: Check CMP today.           Encourage gradual  weight loss.           Discussed the symptoms of biliary colic and asked the patient to call if she has any issues.           Follow up in 6 months.      I spent over 50% of the office visit counseling and answering questions from the patient.

## 2017-09-14 LAB
ALBUMIN SERPL-MCNC: 4 G/DL (ref 3.2–4.8)
ALBUMIN/GLOB SERPL: 1.1 G/DL (ref 1.5–2.5)
ALP SERPL-CCNC: 115 U/L (ref 25–100)
ALT SERPL-CCNC: 33 U/L (ref 7–40)
AST SERPL-CCNC: 23 U/L (ref 0–33)
BILIRUB SERPL-MCNC: 0.3 MG/DL (ref 0.3–1.2)
BUN SERPL-MCNC: 15 MG/DL (ref 9–23)
BUN/CREAT SERPL: 18.8 (ref 7–25)
CALCIUM SERPL-MCNC: 8.8 MG/DL (ref 8.7–10.4)
CHLORIDE SERPL-SCNC: 101 MMOL/L (ref 99–109)
CO2 SERPL-SCNC: 30 MMOL/L (ref 20–31)
CREAT SERPL-MCNC: 0.8 MG/DL (ref 0.6–1.3)
GLOBULIN SER CALC-MCNC: 3.6 GM/DL
GLUCOSE SERPL-MCNC: 121 MG/DL (ref 70–100)
POTASSIUM SERPL-SCNC: 3.8 MMOL/L (ref 3.5–5.5)
PROT SERPL-MCNC: 7.6 G/DL (ref 5.7–8.2)
SODIUM SERPL-SCNC: 135 MMOL/L (ref 132–146)

## 2017-09-15 ENCOUNTER — TELEPHONE (OUTPATIENT)
Dept: GASTROENTEROLOGY | Facility: CLINIC | Age: 37
End: 2017-09-15

## 2017-09-15 NOTE — TELEPHONE ENCOUNTER
----- Message from Deshaun Bonilla MD sent at 9/14/2017  3:50 PM EDT -----  Let Ms. Bee know that the blood tests were good.  Thank you,  LISA

## 2018-03-14 ENCOUNTER — LAB REQUISITION (OUTPATIENT)
Dept: LAB | Facility: HOSPITAL | Age: 38
End: 2018-03-14

## 2018-03-14 ENCOUNTER — OFFICE VISIT (OUTPATIENT)
Dept: GASTROENTEROLOGY | Facility: CLINIC | Age: 38
End: 2018-03-14

## 2018-03-14 VITALS
HEIGHT: 67 IN | SYSTOLIC BLOOD PRESSURE: 112 MMHG | HEART RATE: 81 BPM | OXYGEN SATURATION: 98 % | DIASTOLIC BLOOD PRESSURE: 82 MMHG | BODY MASS INDEX: 45.99 KG/M2 | WEIGHT: 293 LBS | TEMPERATURE: 97.2 F

## 2018-03-14 DIAGNOSIS — K76.0 NAFLD (NONALCOHOLIC FATTY LIVER DISEASE): Primary | ICD-10-CM

## 2018-03-14 DIAGNOSIS — Z00.00 ROUTINE GENERAL MEDICAL EXAMINATION AT A HEALTH CARE FACILITY: ICD-10-CM

## 2018-03-14 PROCEDURE — 99214 OFFICE O/P EST MOD 30 MIN: CPT | Performed by: INTERNAL MEDICINE

## 2018-03-14 PROCEDURE — 36415 COLL VENOUS BLD VENIPUNCTURE: CPT | Performed by: INTERNAL MEDICINE

## 2018-03-14 NOTE — PROGRESS NOTES
PCP: Aye Ventura DO    Chief Complaint   Patient presents with   • Follow-up       History of Present Illness:   HPI  Ms. Bee returns to the office for a follow-up visit.  The patient has been under a considerable amount of stress helping to take care of her mother who has Alzheimer's disease.  She admits that her dietary habits have not been good.  Ms. Bee denies any abdominal pain.  There is no history of change in the color of her eyes or skin.  She denies any nausea or vomiting. She denies any signs of gastrointestinal bleeding.  She has regular bowel habits.  The patient denies any issues with confusion or forgetfulness.  Past Medical History:   Diagnosis Date   • Diabetes mellitus     for a few a months   • Disease of thyroid gland    • Endometrial cancer     follows with gyn onc at    • Fatty liver    • Irritation of right eye 04/07/2016    she will see optho today for exam  Aye Ventura (Internal Medicine)   • Leukocytosis 11/10/2015    repreat in 2 weeks  Aye Ventura (Internal Medicine)   • Lumbar back pain     L5-S1   • Ovarian cancer 10/26/2015    followed by gyn/onc   Aye Ventura (Internal Medicine)   • PTSD (post-traumatic stress disorder)    • RUQ pain 10/26/2015     u/s  Aye Ventura (Internal Medicine)   • Thrombocytosis        Past Surgical History:   Procedure Laterality Date   • FOREIGN BODY REMOVAL Right 7/27/2017    Procedure: RIGHT REMOVAL FOREIGN BODY EAR CANAL;  Surgeon: Frankie Yu MD;  Location: [facility];  Service:    • HYSTERECTOMY     • INNER EAR SURGERY     • LAPAROTOMY OOPHERECTOMY Right    • LEFT OOPHORECTOMY           Current Outpatient Prescriptions:   •  cholecalciferol (VITAMIN D3) 1000 UNITS tablet, Take 1,000 Units by mouth Daily., Disp: , Rfl:   •  FREESTYLE LITE test strip, USE ONE STRIP TO TEST DAILY, Disp: 100 each, Rfl: 0  •  Lancets (FREESTYLE) lancets, , Disp: , Rfl:   •  levothyroxine (SYNTHROID, LEVOTHROID) 50 MCG tablet,  Take 1 tablet by mouth Daily., Disp: 90 tablet, Rfl: 0  •  Multiple Vitamins-Minerals (MULTI-VITAMIN GUMMIES PO), Take  by mouth daily., Disp: , Rfl:   •  Omega-3 Fatty Acids (FISH OIL) 1000 MG capsule capsule, Take  by mouth daily with breakfast., Disp: , Rfl:     No Known Allergies    Family History   Problem Relation Age of Onset   • Arthritis Other      osteoarthritis   • Multiple myeloma Other    • Lung cancer Other    • Lymphoma Other      non-Hodgkin's lymphoma   • Thyroid disease Other    • Lymphoma Mother    • Alzheimer's disease Mother    • Thyroid disease Mother    • Diabetes Father    • Cirrhosis Paternal Grandfather        Social History     Social History   • Marital status: Single     Spouse name: N/A   • Number of children: N/A   • Years of education: N/A     Occupational History   • Not on file.     Social History Main Topics   • Smoking status: Never Smoker   • Smokeless tobacco: Never Used   • Alcohol use No      Comment: Rare   • Drug use: No   • Sexual activity: Not on file      Comment: Single      Other Topics Concern   • Not on file     Social History Narrative   • No narrative on file       Review of Systems   Constitutional: Negative for activity change, appetite change, fatigue, fever and unexpected weight change.   HENT: Negative for dental problem, hearing loss, mouth sores, postnasal drip, sneezing, trouble swallowing and voice change.    Eyes: Negative for pain, redness, itching and visual disturbance.   Respiratory: Negative for cough, choking, chest tightness, shortness of breath and wheezing.    Cardiovascular: Negative for chest pain, palpitations and leg swelling.   Gastrointestinal: Negative for abdominal distention, abdominal pain, anal bleeding, blood in stool, constipation, diarrhea, nausea, rectal pain and vomiting.   Endocrine: Negative for cold intolerance, heat intolerance, polydipsia, polyphagia and polyuria.   Genitourinary: Negative.  Negative for dysuria, enuresis,  flank pain, hematuria and urgency.   Musculoskeletal: Negative for arthralgias, back pain, gait problem, joint swelling and myalgias.   Skin: Negative for color change, pallor and rash.   Allergic/Immunologic: Negative for environmental allergies, food allergies and immunocompromised state.   Neurological: Negative for dizziness, tremors, seizures, facial asymmetry, speech difficulty, numbness and headaches.   Hematological: Negative for adenopathy.   Psychiatric/Behavioral: Negative for behavioral problems, confusion, dysphoric mood, hallucinations and self-injury.       Vitals:    03/14/18 0814   BP: 112/82   Pulse: 81   Temp: 97.2 °F (36.2 °C)   SpO2: 98%       Physical Exam   Constitutional: She is oriented to person, place, and time. No distress.   Morbidly obese   HENT:   Head: Normocephalic and atraumatic.   Mouth/Throat: Oropharynx is clear and moist. No oropharyngeal exudate.   Eyes: EOM are normal. Pupils are equal, round, and reactive to light. No scleral icterus.   Neck: Normal range of motion. Neck supple.   Cardiovascular: Normal rate, regular rhythm and normal heart sounds.  Exam reveals no gallop and no friction rub.    No murmur heard.  Pulmonary/Chest: Effort normal and breath sounds normal. She has no wheezes. She has no rales.   Abdominal: Soft. Bowel sounds are normal. She exhibits no mass. There is no tenderness. There is no guarding.   Large pannus   Musculoskeletal: Normal range of motion. She exhibits no tenderness.   Lymphadenopathy:     She has no cervical adenopathy.   Neurological: She is alert and oriented to person, place, and time. She exhibits normal muscle tone.   Skin: Skin is dry. No rash noted. No erythema.   Psychiatric: She has a normal mood and affect. Her behavior is normal. Thought content normal.   Vitals reviewed.      Norma was seen today for follow-up.    Diagnoses and all orders for this visit:    NAFLD (nonalcoholic fatty liver disease)  -     Comprehensive  Metabolic Panel  -     US Liver; Future    The patient has no clinical signs of hepatic decompensation.  The most recent lab data demonstrated normal albumin and bilirubin.      Plan: Will obtain a CMP today.           Schedule an ultrasound of the liver.           Encouraged gradual weight loss.          Follow-up in 6 months.        I spent over 50% of the office visit counseling and answering questions from the patient.

## 2018-03-15 LAB
ALBUMIN SERPL-MCNC: 4.4 G/DL (ref 3.2–4.8)
ALBUMIN/GLOB SERPL: 1.3 G/DL (ref 1.5–2.5)
ALP SERPL-CCNC: 140 U/L (ref 25–100)
ALT SERPL-CCNC: 36 U/L (ref 7–40)
AST SERPL-CCNC: 20 U/L (ref 0–33)
BILIRUB SERPL-MCNC: 0.4 MG/DL (ref 0.3–1.2)
BUN SERPL-MCNC: 10 MG/DL (ref 9–23)
BUN/CREAT SERPL: 12.5 (ref 7–25)
CALCIUM SERPL-MCNC: 9.3 MG/DL (ref 8.7–10.4)
CHLORIDE SERPL-SCNC: 103 MMOL/L (ref 99–109)
CO2 SERPL-SCNC: 28 MMOL/L (ref 20–31)
CREAT SERPL-MCNC: 0.8 MG/DL (ref 0.6–1.3)
GFR SERPLBLD CREATININE-BSD FMLA CKD-EPI: 80 ML/MIN/1.73
GFR SERPLBLD CREATININE-BSD FMLA CKD-EPI: 97 ML/MIN/1.73
GLOBULIN SER CALC-MCNC: 3.3 GM/DL
GLUCOSE SERPL-MCNC: 176 MG/DL (ref 70–100)
POTASSIUM SERPL-SCNC: 4.2 MMOL/L (ref 3.5–5.5)
PROT SERPL-MCNC: 7.7 G/DL (ref 5.7–8.2)
SODIUM SERPL-SCNC: 138 MMOL/L (ref 132–146)

## 2018-03-19 ENCOUNTER — TELEPHONE (OUTPATIENT)
Dept: GASTROENTEROLOGY | Facility: CLINIC | Age: 38
End: 2018-03-19

## 2018-03-19 NOTE — TELEPHONE ENCOUNTER
----- Message from Deshaun Bonilla MD sent at 3/16/2018  4:37 PM EDT -----  Let Ms. Bee know that the main liver panel tests were good.  Thank you,  LISA

## 2018-03-21 ENCOUNTER — APPOINTMENT (OUTPATIENT)
Dept: ULTRASOUND IMAGING | Facility: HOSPITAL | Age: 38
End: 2018-03-21
Attending: INTERNAL MEDICINE

## 2018-08-07 ENCOUNTER — OFFICE VISIT (OUTPATIENT)
Dept: SLEEP MEDICINE | Facility: HOSPITAL | Age: 38
End: 2018-08-07

## 2018-08-07 VITALS
OXYGEN SATURATION: 96 % | HEIGHT: 66 IN | HEART RATE: 83 BPM | SYSTOLIC BLOOD PRESSURE: 143 MMHG | WEIGHT: 293 LBS | DIASTOLIC BLOOD PRESSURE: 86 MMHG | BODY MASS INDEX: 47.09 KG/M2

## 2018-08-07 DIAGNOSIS — G47.33 OSA (OBSTRUCTIVE SLEEP APNEA): Primary | ICD-10-CM

## 2018-08-07 PROCEDURE — 99213 OFFICE O/P EST LOW 20 MIN: CPT | Performed by: NURSE PRACTITIONER

## 2018-08-07 NOTE — PROGRESS NOTES
Subjective: Follow-up        Chief Complaint:   Chief Complaint   Patient presents with   • Follow-up       HPI:    Norma Bee is a 37 y.o. female here for follow-up of elisabeth.  Patient states she is doing well with her CPAP.  She is getting 5-7 hours of sleep every night.  Her mother passed away 2 months ago , she was the main caregiver she now hopes her schedule will allow her  to she can get back to 7-9 hours of sleep.  [place] score 3/24.  No problems with pressure settings.        Current medications are:   Current Outpatient Prescriptions:   •  cholecalciferol (VITAMIN D3) 1000 UNITS tablet, Take 1,000 Units by mouth Daily., Disp: , Rfl:   •  levothyroxine (SYNTHROID, LEVOTHROID) 50 MCG tablet, Take 1 tablet by mouth Daily., Disp: 90 tablet, Rfl: 0  •  Multiple Vitamins-Minerals (MULTI-VITAMIN GUMMIES PO), Take  by mouth daily., Disp: , Rfl:   •  Omega-3 Fatty Acids (FISH OIL) 1000 MG capsule capsule, Take  by mouth daily with breakfast., Disp: , Rfl:   •  FREESTYLE LITE test strip, USE ONE STRIP TO TEST DAILY, Disp: 100 each, Rfl: 0  •  Lancets (FREESTYLE) lancets, , Disp: , Rfl: .      The patient's relevant past medical, surgical, family and social history were reviewed and updated in Epic as appropriate.       Review of Systems   Constitutional: Negative for fatigue.   HENT: Positive for congestion, postnasal drip and rhinorrhea.    Respiratory: Positive for apnea.    Gastrointestinal: Positive for nausea.   Endocrine: Positive for cold intolerance and heat intolerance.   Musculoskeletal: Positive for back pain.   Skin: Positive for rash.   Allergic/Immunologic: Positive for environmental allergies.   Neurological: Positive for headaches.   Psychiatric/Behavioral: Positive for dysphoric mood and sleep disturbance. The patient is nervous/anxious.          Objective:    Physical Exam   Constitutional: She is oriented to person, place, and time. She appears well-developed and well-nourished.   Morbid  obesity   HENT:   Head: Normocephalic and atraumatic.   Mouth/Throat: Oropharynx is clear and moist.   Mallampati 3 anatomy   Eyes: Conjunctivae are normal.   Neck: Neck supple. No thyromegaly present.   Cardiovascular: Normal rate and regular rhythm.    Pulmonary/Chest: Effort normal and breath sounds normal.   Lymphadenopathy:     She has no cervical adenopathy.   Neurological: She is alert and oriented to person, place, and time.   Skin: Skin is warm and dry.   Psychiatric: She has a normal mood and affect. Her behavior is normal. Judgment and thought content normal.   Nursing note and vitals reviewed.    Download reviewed reviewed using greater than 4 hours at 99.4% AHI 0.3.  90% pressure is 9.0 cm H2O.    ASSESSMENT/PLAN    Norma was seen today for follow-up.    Diagnoses and all orders for this visit:    MARCEL (obstructive sleep apnea)  -     CPAP Therapy            1. Counseled patient regarding multimodal approach with healthy nutrition, healthy sleep, regular physical activity, social activities, counseling, and medications. Encouraged to practice lateral sleep position. Avoid alcohol and sedatives close to bedtime.  2. Refill supplies ×1 year.  3. Return to clinic one year or sooner if symptoms warrant.    I have reviewed the results of my evaluation and impression and discussed my recommendations in detail with the patient.      Signed by  XAVIER Bray    August 7, 2018      CC: Aye Ventura DO          No ref. provider found

## 2018-08-07 NOTE — PATIENT INSTRUCTIONS

## 2018-08-27 ENCOUNTER — HOSPITAL ENCOUNTER (OUTPATIENT)
Dept: ULTRASOUND IMAGING | Facility: HOSPITAL | Age: 38
Discharge: HOME | End: 2018-08-27
Attending: INTERNAL MEDICINE | Admitting: INTERNAL MEDICINE

## 2018-08-27 DIAGNOSIS — K76.0 NAFLD (NONALCOHOLIC FATTY LIVER DISEASE): ICD-10-CM

## 2018-08-27 PROCEDURE — 76705 ECHO EXAM OF ABDOMEN: CPT

## 2018-08-28 ENCOUNTER — TELEPHONE (OUTPATIENT)
Dept: GASTROENTEROLOGY | Facility: CLINIC | Age: 38
End: 2018-08-28

## 2018-08-28 NOTE — TELEPHONE ENCOUNTER
Left a message regarding the ultrasound.  The patient does have evidence of gallstones.  The bile duct is 8 mm which is mildly dilated.  I stated if she is having any postmeal discomfort or nausea then would consider referral to general surgical colleague.

## 2018-09-19 ENCOUNTER — LAB REQUISITION (OUTPATIENT)
Dept: LAB | Facility: HOSPITAL | Age: 38
End: 2018-09-19

## 2018-09-19 ENCOUNTER — OFFICE VISIT (OUTPATIENT)
Dept: GASTROENTEROLOGY | Facility: CLINIC | Age: 38
End: 2018-09-19

## 2018-09-19 VITALS
DIASTOLIC BLOOD PRESSURE: 82 MMHG | BODY MASS INDEX: 47.09 KG/M2 | SYSTOLIC BLOOD PRESSURE: 128 MMHG | OXYGEN SATURATION: 97 % | WEIGHT: 293 LBS | HEART RATE: 90 BPM | HEIGHT: 66 IN | TEMPERATURE: 96.5 F

## 2018-09-19 DIAGNOSIS — K76.0 FATTY (CHANGE OF) LIVER, NOT ELSEWHERE CLASSIFIED: ICD-10-CM

## 2018-09-19 DIAGNOSIS — Z00.00 ROUTINE GENERAL MEDICAL EXAMINATION AT A HEALTH CARE FACILITY: ICD-10-CM

## 2018-09-19 DIAGNOSIS — R11.0 NAUSEA: ICD-10-CM

## 2018-09-19 DIAGNOSIS — K80.20 CALCULUS OF GALLBLADDER WITHOUT CHOLECYSTITIS WITHOUT OBSTRUCTION: ICD-10-CM

## 2018-09-19 DIAGNOSIS — K76.0 NAFLD (NONALCOHOLIC FATTY LIVER DISEASE): ICD-10-CM

## 2018-09-19 DIAGNOSIS — R10.10 UPPER ABDOMINAL PAIN: ICD-10-CM

## 2018-09-19 DIAGNOSIS — R10.10 PAIN OF UPPER ABDOMEN: ICD-10-CM

## 2018-09-19 DIAGNOSIS — K80.20 GALLSTONES: ICD-10-CM

## 2018-09-19 DIAGNOSIS — R11.0 NAUSEA: Primary | ICD-10-CM

## 2018-09-19 LAB
ALBUMIN SERPL-MCNC: 4.45 G/DL (ref 3.2–4.8)
ALBUMIN/GLOB SERPL: 1.5 G/DL (ref 1.5–2.5)
ALP SERPL-CCNC: 124 U/L (ref 25–100)
ALT SERPL-CCNC: 38 U/L (ref 7–40)
AST SERPL-CCNC: 29 U/L (ref 0–33)
BILIRUB SERPL-MCNC: 0.4 MG/DL (ref 0.3–1.2)
BUN SERPL-MCNC: 11 MG/DL (ref 9–23)
BUN/CREAT SERPL: 12.9 (ref 7–25)
CALCIUM SERPL-MCNC: 8.9 MG/DL (ref 8.7–10.4)
CHLORIDE SERPL-SCNC: 105 MMOL/L (ref 99–109)
CO2 SERPL-SCNC: 24 MMOL/L (ref 20–31)
CREAT SERPL-MCNC: 0.85 MG/DL (ref 0.6–1.3)
GLOBULIN SER CALC-MCNC: 3 GM/DL
GLUCOSE SERPL-MCNC: 151 MG/DL (ref 70–100)
POTASSIUM SERPL-SCNC: 4.1 MMOL/L (ref 3.5–5.5)
PROT SERPL-MCNC: 7.4 G/DL (ref 5.7–8.2)
SODIUM SERPL-SCNC: 138 MMOL/L (ref 132–146)

## 2018-09-19 PROCEDURE — 36415 COLL VENOUS BLD VENIPUNCTURE: CPT | Performed by: INTERNAL MEDICINE

## 2018-09-19 PROCEDURE — 99213 OFFICE O/P EST LOW 20 MIN: CPT | Performed by: INTERNAL MEDICINE

## 2018-09-19 NOTE — PROGRESS NOTES
PCP: Aye Ventura DO    Chief Complaint   Patient presents with   • Follow-up       History of Present Illness:   HPI   Ms. Bee returns to the office for a follow-up visit.  She admits to having some issues with nausea intermittently.  The patient has experienced some upper abdominal discomfort at times after meals.  She is not able to delineate how often this occurs.  She denies any difficult or painful swallowing.  There is no history of breakthrough heartburn.  The patient has not lost weight since her last office visit.  She denies any signs of gastrointestinal bleeding.    Past Medical History:   Diagnosis Date   • Diabetes mellitus (CMS/HCC)     for a few a months   • Disease of thyroid gland    • Endometrial cancer (CMS/HCC)     follows with gyn onc at    • Fatty liver    • Irritation of right eye 04/07/2016    she will see optho today for exam  Aye Ventura (Internal Medicine)   • Leukocytosis 11/10/2015    repreat in 2 weeks  Aye Ventura (Internal Medicine)   • Lumbar back pain     L5-S1   • Ovarian cancer (CMS/HCC) 10/26/2015    followed by gyn/onc   Aye Ventura (Internal Medicine)   • PTSD (post-traumatic stress disorder)    • RUQ pain 10/26/2015     u/s  Aye Ventura (Internal Medicine)   • Thrombocytosis (CMS/HCC)        Past Surgical History:   Procedure Laterality Date   • FOREIGN BODY REMOVAL Right 7/27/2017    Procedure: RIGHT REMOVAL FOREIGN BODY EAR CANAL;  Surgeon: Frankie Yu MD;  Location: [facility];  Service:    • HYSTERECTOMY     • INNER EAR SURGERY     • LAPAROTOMY OOPHERECTOMY Right    • LEFT OOPHORECTOMY           Current Outpatient Prescriptions:   •  cholecalciferol (VITAMIN D3) 1000 UNITS tablet, Take 1,000 Units by mouth Daily., Disp: , Rfl:   •  FREESTYLE LITE test strip, USE ONE STRIP TO TEST DAILY, Disp: 100 each, Rfl: 0  •  Lancets (FREESTYLE) lancets, , Disp: , Rfl:   •  levothyroxine (SYNTHROID, LEVOTHROID) 50 MCG tablet, Take 1 tablet by  mouth Daily. (Patient taking differently: Take 75 mcg by mouth Daily.), Disp: 90 tablet, Rfl: 0  •  Multiple Vitamins-Minerals (MULTI-VITAMIN GUMMIES PO), Take  by mouth daily., Disp: , Rfl:   •  Omega-3 Fatty Acids (FISH OIL) 1000 MG capsule capsule, Take  by mouth daily with breakfast., Disp: , Rfl:     No Known Allergies    Family History   Problem Relation Age of Onset   • Arthritis Other         osteoarthritis   • Multiple myeloma Other    • Lung cancer Other    • Lymphoma Other         non-Hodgkin's lymphoma   • Thyroid disease Other    • Lymphoma Mother    • Alzheimer's disease Mother    • Thyroid disease Mother    • Diabetes Father    • Cirrhosis Paternal Grandfather        Social History     Social History   • Marital status: Single     Spouse name: N/A   • Number of children: N/A   • Years of education: N/A     Occupational History   • Not on file.     Social History Main Topics   • Smoking status: Never Smoker   • Smokeless tobacco: Never Used   • Alcohol use No      Comment: Rare   • Drug use: No   • Sexual activity: Defer      Comment: Single      Other Topics Concern   • Not on file     Social History Narrative   • No narrative on file       Review of Systems   Constitutional: Negative for activity change, appetite change, fatigue, fever and unexpected weight change.   HENT: Negative for dental problem, hearing loss, mouth sores, postnasal drip, sneezing, trouble swallowing and voice change.    Eyes: Negative for pain, redness, itching and visual disturbance.   Respiratory: Negative for cough, choking, chest tightness, shortness of breath and wheezing.    Cardiovascular: Negative for chest pain, palpitations and leg swelling.   Gastrointestinal: Positive for abdominal pain and nausea. Negative for abdominal distention, anal bleeding, blood in stool, constipation, diarrhea, rectal pain and vomiting.   Endocrine: Negative for cold intolerance, heat intolerance, polydipsia, polyphagia and polyuria.    Genitourinary: Negative.  Negative for dysuria, enuresis, flank pain, hematuria and urgency.   Musculoskeletal: Negative for arthralgias, back pain, gait problem, joint swelling and myalgias.   Skin: Negative for color change, pallor and rash.   Allergic/Immunologic: Negative for environmental allergies, food allergies and immunocompromised state.   Neurological: Negative for dizziness, tremors, seizures, facial asymmetry, speech difficulty, numbness and headaches.   Hematological: Negative for adenopathy.   Psychiatric/Behavioral: Negative for behavioral problems, confusion, dysphoric mood, hallucinations and self-injury.       Vitals:    09/19/18 0914   BP: 128/82   Pulse: 90   Temp: 96.5 °F (35.8 °C)   SpO2: 97%       Physical Exam   Constitutional: She is oriented to person, place, and time. She appears well-nourished. No distress.   Morbid obesity   HENT:   Head: Normocephalic and atraumatic.   Mouth/Throat: Oropharynx is clear and moist.   Eyes: EOM are normal. No scleral icterus.   Neck: Neck supple. No thyromegaly present.   Cardiovascular: Normal rate, regular rhythm and normal heart sounds.  Exam reveals no gallop.    No murmur heard.  Pulmonary/Chest: Effort normal and breath sounds normal. She has no wheezes. She has no rales.   Abdominal: Soft. Bowel sounds are normal. There is tenderness (right upper quadrant). There is no rebound and no guarding.   Large pannus   Musculoskeletal: Normal range of motion. She exhibits no edema.   Lymphadenopathy:     She has no cervical adenopathy.   Neurological: She is alert and oriented to person, place, and time. She exhibits normal muscle tone.   Skin: Skin is dry. No erythema.   Psychiatric: She has a normal mood and affect. Her behavior is normal. Thought content normal.   Vitals reviewed.      Norma was seen today for follow-up.    Diagnoses and all orders for this visit:    Nausea  -     Comprehensive Metabolic Panel  -     Ambulatory Referral to General  Surgery    Gallstones  -     Comprehensive Metabolic Panel  -     Ambulatory Referral to General Surgery    NAFLD (nonalcoholic fatty liver disease)  -     Comprehensive Metabolic Panel  -     Ambulatory Referral to General Surgery    Pain of upper abdomen  -     Comprehensive Metabolic Panel  -     Ambulatory Referral to General Surgery    The patient does have nonalcohol fatty liver disease.  However the ultrasound has demonstrated gallstones on 2 occasions and the bile duct is slightly more dilated than before at 8 mm. There are some aspects of the history that suggests biliary colic.      Plan: Will obtain CMP today.           Will refer to general surgery for discussion with regard to laparoscopic cholecystectomy and possible intraoperative liver biopsy.    I spent over 50% of the office visit counseling and answering questions from the patient.

## 2018-11-07 ENCOUNTER — APPOINTMENT (OUTPATIENT)
Dept: PREADMISSION TESTING | Facility: HOSPITAL | Age: 38
End: 2018-11-07

## 2018-11-07 VITALS — BODY MASS INDEX: 44.41 KG/M2 | WEIGHT: 293 LBS | HEIGHT: 68 IN

## 2018-11-07 LAB
ALBUMIN SERPL-MCNC: 4.1 G/DL (ref 3.2–4.8)
ALBUMIN/GLOB SERPL: 1.5 G/DL (ref 1.5–2.5)
ALP SERPL-CCNC: 106 U/L (ref 25–100)
ALT SERPL W P-5'-P-CCNC: 34 U/L (ref 7–40)
ANION GAP SERPL CALCULATED.3IONS-SCNC: 7 MMOL/L (ref 3–11)
AST SERPL-CCNC: 25 U/L (ref 0–33)
BILIRUB SERPL-MCNC: 0.5 MG/DL (ref 0.3–1.2)
BUN BLD-MCNC: 11 MG/DL (ref 9–23)
BUN/CREAT SERPL: 10.6 (ref 7–25)
CALCIUM SPEC-SCNC: 8.6 MG/DL (ref 8.7–10.4)
CHLORIDE SERPL-SCNC: 103 MMOL/L (ref 99–109)
CO2 SERPL-SCNC: 25 MMOL/L (ref 20–31)
CREAT BLD-MCNC: 1.04 MG/DL (ref 0.6–1.3)
DEPRECATED RDW RBC AUTO: 49.1 FL (ref 37–54)
ERYTHROCYTE [DISTWIDTH] IN BLOOD BY AUTOMATED COUNT: 15.6 % (ref 11.3–14.5)
GFR SERPL CREATININE-BSD FRML MDRD: 59 ML/MIN/1.73
GLOBULIN UR ELPH-MCNC: 2.7 GM/DL
GLUCOSE BLD-MCNC: 203 MG/DL (ref 70–100)
HCT VFR BLD AUTO: 37.7 % (ref 34.5–44)
HGB BLD-MCNC: 12.2 G/DL (ref 11.5–15.5)
MCH RBC QN AUTO: 27.9 PG (ref 27–31)
MCHC RBC AUTO-ENTMCNC: 32.4 G/DL (ref 32–36)
MCV RBC AUTO: 86.1 FL (ref 80–99)
PLATELET # BLD AUTO: 212 10*3/MM3 (ref 150–450)
PMV BLD AUTO: 9.3 FL (ref 6–12)
POTASSIUM BLD-SCNC: 3.9 MMOL/L (ref 3.5–5.5)
PROT SERPL-MCNC: 6.8 G/DL (ref 5.7–8.2)
RBC # BLD AUTO: 4.38 10*6/MM3 (ref 3.89–5.14)
SODIUM BLD-SCNC: 135 MMOL/L (ref 132–146)
WBC NRBC COR # BLD: 7.74 10*3/MM3 (ref 3.5–10.8)

## 2018-11-07 PROCEDURE — 80053 COMPREHEN METABOLIC PANEL: CPT | Performed by: SURGERY

## 2018-11-07 PROCEDURE — 93005 ELECTROCARDIOGRAM TRACING: CPT

## 2018-11-07 PROCEDURE — 36415 COLL VENOUS BLD VENIPUNCTURE: CPT

## 2018-11-07 PROCEDURE — 93010 ELECTROCARDIOGRAM REPORT: CPT | Performed by: INTERNAL MEDICINE

## 2018-11-07 PROCEDURE — 85027 COMPLETE CBC AUTOMATED: CPT | Performed by: SURGERY

## 2018-11-07 RX ORDER — LEVOTHYROXINE SODIUM 0.07 MG/1
125 TABLET ORAL DAILY
COMMUNITY
End: 2021-08-25

## 2018-11-07 RX ORDER — ERGOCALCIFEROL 1.25 MG/1
50000 CAPSULE ORAL WEEKLY
COMMUNITY
End: 2019-09-24

## 2018-11-07 NOTE — PAT
Patient to apply to surgical area (as instructed) the night before procedure and the AM of procedure.    Abnormal EKG cleared by Chuy.     Patient denies latex allergy.

## 2018-11-07 NOTE — DISCHARGE INSTRUCTIONS

## 2018-11-13 ENCOUNTER — ANESTHESIA EVENT (OUTPATIENT)
Dept: PERIOP | Facility: HOSPITAL | Age: 38
End: 2018-11-13

## 2018-11-13 ENCOUNTER — ANESTHESIA (OUTPATIENT)
Dept: PERIOP | Facility: HOSPITAL | Age: 38
End: 2018-11-13

## 2018-11-13 ENCOUNTER — HOSPITAL ENCOUNTER (OUTPATIENT)
Facility: HOSPITAL | Age: 38
Setting detail: HOSPITAL OUTPATIENT SURGERY
Discharge: HOME | End: 2018-11-13
Attending: SURGERY | Admitting: SURGERY

## 2018-11-13 ENCOUNTER — APPOINTMENT (OUTPATIENT)
Dept: GENERAL RADIOLOGY | Facility: HOSPITAL | Age: 38
End: 2018-11-13

## 2018-11-13 VITALS
SYSTOLIC BLOOD PRESSURE: 132 MMHG | DIASTOLIC BLOOD PRESSURE: 78 MMHG | HEART RATE: 71 BPM | TEMPERATURE: 98.2 F | OXYGEN SATURATION: 95 % | RESPIRATION RATE: 16 BRPM

## 2018-11-13 DIAGNOSIS — K80.20 GALLSTONES: ICD-10-CM

## 2018-11-13 LAB — GLUCOSE BLDC GLUCOMTR-MCNC: 144 MG/DL (ref 70–130)

## 2018-11-13 PROCEDURE — 25010000002 NEOSTIGMINE PER 0.5 MG: Performed by: NURSE ANESTHETIST, CERTIFIED REGISTERED

## 2018-11-13 PROCEDURE — 25010000002 IOPAMIDOL 61 % SOLUTION: Performed by: SURGERY

## 2018-11-13 PROCEDURE — 25010000002 MIDAZOLAM PER 1 MG: Performed by: NURSE ANESTHETIST, CERTIFIED REGISTERED

## 2018-11-13 PROCEDURE — 88304 TISSUE EXAM BY PATHOLOGIST: CPT | Performed by: SURGERY

## 2018-11-13 PROCEDURE — 88307 TISSUE EXAM BY PATHOLOGIST: CPT | Performed by: SURGERY

## 2018-11-13 PROCEDURE — 25010000002 FENTANYL CITRATE (PF) 100 MCG/2ML SOLUTION: Performed by: NURSE ANESTHETIST, CERTIFIED REGISTERED

## 2018-11-13 PROCEDURE — 25010000002 ONDANSETRON PER 1 MG: Performed by: NURSE ANESTHETIST, CERTIFIED REGISTERED

## 2018-11-13 PROCEDURE — 25010000002 DEXAMETHASONE PER 1 MG: Performed by: NURSE ANESTHETIST, CERTIFIED REGISTERED

## 2018-11-13 PROCEDURE — 82962 GLUCOSE BLOOD TEST: CPT

## 2018-11-13 PROCEDURE — 25010000002 HYDROMORPHONE PER 4 MG: Performed by: NURSE ANESTHETIST, CERTIFIED REGISTERED

## 2018-11-13 PROCEDURE — 74300 X-RAY BILE DUCTS/PANCREAS: CPT

## 2018-11-13 PROCEDURE — 25010000002 PROMETHAZINE PER 50 MG: Performed by: NURSE ANESTHETIST, CERTIFIED REGISTERED

## 2018-11-13 PROCEDURE — 25010000002 PROPOFOL 10 MG/ML EMULSION: Performed by: NURSE ANESTHETIST, CERTIFIED REGISTERED

## 2018-11-13 RX ORDER — PROMETHAZINE HYDROCHLORIDE 25 MG/ML
6.25 INJECTION, SOLUTION INTRAMUSCULAR; INTRAVENOUS ONCE AS NEEDED
Status: COMPLETED | OUTPATIENT
Start: 2018-11-13 | End: 2018-11-13

## 2018-11-13 RX ORDER — HYDROMORPHONE HYDROCHLORIDE 1 MG/ML
0.5 INJECTION, SOLUTION INTRAMUSCULAR; INTRAVENOUS; SUBCUTANEOUS
Status: DISCONTINUED | OUTPATIENT
Start: 2018-11-13 | End: 2018-11-13 | Stop reason: HOSPADM

## 2018-11-13 RX ORDER — SODIUM CHLORIDE 0.9 % (FLUSH) 0.9 %
1-10 SYRINGE (ML) INJECTION AS NEEDED
Status: DISCONTINUED | OUTPATIENT
Start: 2018-11-13 | End: 2018-11-13 | Stop reason: HOSPADM

## 2018-11-13 RX ORDER — FAMOTIDINE 20 MG/1
20 TABLET, FILM COATED ORAL
Status: DISCONTINUED | OUTPATIENT
Start: 2018-11-13 | End: 2018-11-13 | Stop reason: HOSPADM

## 2018-11-13 RX ORDER — BUPIVACAINE HYDROCHLORIDE AND EPINEPHRINE 2.5; 5 MG/ML; UG/ML
INJECTION, SOLUTION EPIDURAL; INFILTRATION; INTRACAUDAL; PERINEURAL AS NEEDED
Status: DISCONTINUED | OUTPATIENT
Start: 2018-11-13 | End: 2018-11-13 | Stop reason: HOSPADM

## 2018-11-13 RX ORDER — PROMETHAZINE HYDROCHLORIDE 12.5 MG/1
12.5 TABLET ORAL ONCE AS NEEDED
Status: DISCONTINUED | OUTPATIENT
Start: 2018-11-13 | End: 2018-11-13 | Stop reason: HOSPADM

## 2018-11-13 RX ORDER — CEFAZOLIN SODIUM IN 0.9 % NACL 3 G/100 ML
3 INTRAVENOUS SOLUTION, PIGGYBACK (ML) INTRAVENOUS ONCE
Status: COMPLETED | OUTPATIENT
Start: 2018-11-13 | End: 2018-11-13

## 2018-11-13 RX ORDER — PROMETHAZINE HYDROCHLORIDE 25 MG/1
25 TABLET ORAL ONCE AS NEEDED
Status: COMPLETED | OUTPATIENT
Start: 2018-11-13 | End: 2018-11-13

## 2018-11-13 RX ORDER — MEPERIDINE HYDROCHLORIDE 25 MG/ML
12.5 INJECTION INTRAMUSCULAR; INTRAVENOUS; SUBCUTANEOUS
Status: DISCONTINUED | OUTPATIENT
Start: 2018-11-13 | End: 2018-11-13 | Stop reason: HOSPADM

## 2018-11-13 RX ORDER — OXYCODONE HYDROCHLORIDE AND ACETAMINOPHEN 5; 325 MG/1; MG/1
1 TABLET ORAL ONCE AS NEEDED
Status: DISCONTINUED | OUTPATIENT
Start: 2018-11-13 | End: 2018-11-13 | Stop reason: HOSPADM

## 2018-11-13 RX ORDER — LABETALOL HYDROCHLORIDE 5 MG/ML
INJECTION, SOLUTION INTRAVENOUS AS NEEDED
Status: DISCONTINUED | OUTPATIENT
Start: 2018-11-13 | End: 2018-11-13 | Stop reason: SURG

## 2018-11-13 RX ORDER — MIDAZOLAM HYDROCHLORIDE 1 MG/ML
INJECTION INTRAMUSCULAR; INTRAVENOUS AS NEEDED
Status: DISCONTINUED | OUTPATIENT
Start: 2018-11-13 | End: 2018-11-13 | Stop reason: SURG

## 2018-11-13 RX ORDER — SODIUM CHLORIDE 0.9 % (FLUSH) 0.9 %
3 SYRINGE (ML) INJECTION EVERY 12 HOURS SCHEDULED
Status: DISCONTINUED | OUTPATIENT
Start: 2018-11-13 | End: 2018-11-13 | Stop reason: HOSPADM

## 2018-11-13 RX ORDER — FENTANYL CITRATE 50 UG/ML
50 INJECTION, SOLUTION INTRAMUSCULAR; INTRAVENOUS
Status: DISCONTINUED | OUTPATIENT
Start: 2018-11-13 | End: 2018-11-13 | Stop reason: HOSPADM

## 2018-11-13 RX ORDER — SODIUM CHLORIDE 9 MG/ML
INJECTION, SOLUTION INTRAVENOUS AS NEEDED
Status: DISCONTINUED | OUTPATIENT
Start: 2018-11-13 | End: 2018-11-13 | Stop reason: HOSPADM

## 2018-11-13 RX ORDER — PROMETHAZINE HYDROCHLORIDE 25 MG/1
25 SUPPOSITORY RECTAL ONCE AS NEEDED
Status: COMPLETED | OUTPATIENT
Start: 2018-11-13 | End: 2018-11-13

## 2018-11-13 RX ORDER — OXYCODONE AND ACETAMINOPHEN 7.5; 325 MG/1; MG/1
1 TABLET ORAL ONCE AS NEEDED
Status: COMPLETED | OUTPATIENT
Start: 2018-11-13 | End: 2018-11-13

## 2018-11-13 RX ORDER — SODIUM CHLORIDE, SODIUM LACTATE, POTASSIUM CHLORIDE, CALCIUM CHLORIDE 600; 310; 30; 20 MG/100ML; MG/100ML; MG/100ML; MG/100ML
9 INJECTION, SOLUTION INTRAVENOUS CONTINUOUS PRN
Status: DISCONTINUED | OUTPATIENT
Start: 2018-11-13 | End: 2018-11-13 | Stop reason: HOSPADM

## 2018-11-13 RX ORDER — DOCUSATE SODIUM 100 MG/1
100 CAPSULE, LIQUID FILLED ORAL ONCE
Status: DISCONTINUED | OUTPATIENT
Start: 2018-11-13 | End: 2018-11-13 | Stop reason: HOSPADM

## 2018-11-13 RX ORDER — OXYCODONE HYDROCHLORIDE AND ACETAMINOPHEN 5; 325 MG/1; MG/1
1-2 TABLET ORAL EVERY 4 HOURS PRN
Qty: 37 TABLET | Refills: 0 | Status: SHIPPED | OUTPATIENT
Start: 2018-11-13 | End: 2019-09-24

## 2018-11-13 RX ORDER — LIDOCAINE HYDROCHLORIDE 10 MG/ML
0.5 INJECTION, SOLUTION EPIDURAL; INFILTRATION; INTRACAUDAL; PERINEURAL ONCE AS NEEDED
Status: COMPLETED | OUTPATIENT
Start: 2018-11-13 | End: 2018-11-13

## 2018-11-13 RX ORDER — PROPOFOL 10 MG/ML
VIAL (ML) INTRAVENOUS AS NEEDED
Status: DISCONTINUED | OUTPATIENT
Start: 2018-11-13 | End: 2018-11-13 | Stop reason: SURG

## 2018-11-13 RX ORDER — DEXAMETHASONE SODIUM PHOSPHATE 4 MG/ML
INJECTION, SOLUTION INTRA-ARTICULAR; INTRALESIONAL; INTRAMUSCULAR; INTRAVENOUS; SOFT TISSUE AS NEEDED
Status: DISCONTINUED | OUTPATIENT
Start: 2018-11-13 | End: 2018-11-13 | Stop reason: SURG

## 2018-11-13 RX ORDER — ATRACURIUM BESYLATE 10 MG/ML
INJECTION, SOLUTION INTRAVENOUS AS NEEDED
Status: DISCONTINUED | OUTPATIENT
Start: 2018-11-13 | End: 2018-11-13 | Stop reason: SURG

## 2018-11-13 RX ORDER — ONDANSETRON 2 MG/ML
INJECTION INTRAMUSCULAR; INTRAVENOUS AS NEEDED
Status: DISCONTINUED | OUTPATIENT
Start: 2018-11-13 | End: 2018-11-13 | Stop reason: SURG

## 2018-11-13 RX ORDER — GLYCOPYRROLATE 0.2 MG/ML
INJECTION INTRAMUSCULAR; INTRAVENOUS AS NEEDED
Status: DISCONTINUED | OUTPATIENT
Start: 2018-11-13 | End: 2018-11-13 | Stop reason: SURG

## 2018-11-13 RX ORDER — DOCUSATE SODIUM 250 MG
250 CAPSULE ORAL 2 TIMES DAILY
Qty: 20 CAPSULE | Refills: 0 | Status: SHIPPED | OUTPATIENT
Start: 2018-11-13 | End: 2019-09-24

## 2018-11-13 RX ORDER — LIDOCAINE HYDROCHLORIDE 10 MG/ML
INJECTION, SOLUTION INFILTRATION; PERINEURAL AS NEEDED
Status: DISCONTINUED | OUTPATIENT
Start: 2018-11-13 | End: 2018-11-13 | Stop reason: SURG

## 2018-11-13 RX ORDER — IPRATROPIUM BROMIDE AND ALBUTEROL SULFATE 2.5; .5 MG/3ML; MG/3ML
3 SOLUTION RESPIRATORY (INHALATION) ONCE AS NEEDED
Status: DISCONTINUED | OUTPATIENT
Start: 2018-11-13 | End: 2018-11-13 | Stop reason: HOSPADM

## 2018-11-13 RX ORDER — FENTANYL CITRATE 50 UG/ML
INJECTION, SOLUTION INTRAMUSCULAR; INTRAVENOUS AS NEEDED
Status: DISCONTINUED | OUTPATIENT
Start: 2018-11-13 | End: 2018-11-13 | Stop reason: SURG

## 2018-11-13 RX ORDER — LABETALOL HYDROCHLORIDE 5 MG/ML
5 INJECTION, SOLUTION INTRAVENOUS
Status: DISCONTINUED | OUTPATIENT
Start: 2018-11-13 | End: 2018-11-13 | Stop reason: HOSPADM

## 2018-11-13 RX ADMIN — PROMETHAZINE HYDROCHLORIDE 6.25 MG: 25 INJECTION INTRAMUSCULAR; INTRAVENOUS at 12:56

## 2018-11-13 RX ADMIN — OXYCODONE HYDROCHLORIDE AND ACETAMINOPHEN 1 TABLET: 7.5; 325 TABLET ORAL at 14:28

## 2018-11-13 RX ADMIN — HYDROMORPHONE HYDROCHLORIDE 0.5 MG: 1 INJECTION, SOLUTION INTRAMUSCULAR; INTRAVENOUS; SUBCUTANEOUS at 12:15

## 2018-11-13 RX ADMIN — ATRACURIUM BESYLATE 30 MG: 10 INJECTION, SOLUTION INTRAVENOUS at 10:49

## 2018-11-13 RX ADMIN — FENTANYL CITRATE 100 MCG: 50 INJECTION, SOLUTION INTRAMUSCULAR; INTRAVENOUS at 10:49

## 2018-11-13 RX ADMIN — Medication 3 MG: at 11:56

## 2018-11-13 RX ADMIN — DEXAMETHASONE SODIUM PHOSPHATE 8 MG: 4 INJECTION, SOLUTION INTRAMUSCULAR; INTRAVENOUS at 10:53

## 2018-11-13 RX ADMIN — Medication 3 G: at 10:46

## 2018-11-13 RX ADMIN — PROPOFOL 200 MG: 10 INJECTION, EMULSION INTRAVENOUS at 10:49

## 2018-11-13 RX ADMIN — HYDROMORPHONE HYDROCHLORIDE 0.5 MG: 1 INJECTION, SOLUTION INTRAMUSCULAR; INTRAVENOUS; SUBCUTANEOUS at 12:10

## 2018-11-13 RX ADMIN — MIDAZOLAM HYDROCHLORIDE 2 MG: 1 INJECTION, SOLUTION INTRAMUSCULAR; INTRAVENOUS at 10:49

## 2018-11-13 RX ADMIN — FENTANYL CITRATE 100 MCG: 50 INJECTION, SOLUTION INTRAMUSCULAR; INTRAVENOUS at 11:29

## 2018-11-13 RX ADMIN — HYDROMORPHONE HYDROCHLORIDE 0.5 MG: 1 INJECTION, SOLUTION INTRAMUSCULAR; INTRAVENOUS; SUBCUTANEOUS at 12:30

## 2018-11-13 RX ADMIN — SODIUM CHLORIDE, POTASSIUM CHLORIDE, SODIUM LACTATE AND CALCIUM CHLORIDE 9 ML/HR: 600; 310; 30; 20 INJECTION, SOLUTION INTRAVENOUS at 09:26

## 2018-11-13 RX ADMIN — FENTANYL CITRATE 50 MCG: 50 INJECTION, SOLUTION INTRAMUSCULAR; INTRAVENOUS at 12:49

## 2018-11-13 RX ADMIN — ONDANSETRON 4 MG: 2 INJECTION INTRAMUSCULAR; INTRAVENOUS at 11:56

## 2018-11-13 RX ADMIN — FAMOTIDINE 20 MG: 20 TABLET ORAL at 09:41

## 2018-11-13 RX ADMIN — LIDOCAINE HYDROCHLORIDE 0.2 ML: 10 INJECTION, SOLUTION EPIDURAL; INFILTRATION; INTRACAUDAL; PERINEURAL at 09:26

## 2018-11-13 RX ADMIN — PROPOFOL 50 MG: 10 INJECTION, EMULSION INTRAVENOUS at 11:25

## 2018-11-13 RX ADMIN — LABETALOL HYDROCHLORIDE 2.5 MG: 5 INJECTION, SOLUTION INTRAVENOUS at 10:55

## 2018-11-13 RX ADMIN — LIDOCAINE HYDROCHLORIDE 50 MG: 10 INJECTION, SOLUTION INFILTRATION; PERINEURAL at 10:49

## 2018-11-13 RX ADMIN — GLYCOPYRROLATE 0.4 MG: 0.2 INJECTION, SOLUTION INTRAMUSCULAR; INTRAVENOUS at 11:56

## 2018-11-13 RX ADMIN — LABETALOL HYDROCHLORIDE 2.5 MG: 5 INJECTION, SOLUTION INTRAVENOUS at 11:20

## 2018-11-13 NOTE — ANESTHESIA PREPROCEDURE EVALUATION
Anesthesia Evaluation     Patient summary reviewed and Nursing notes reviewed   history of anesthetic complications:  NPO Solid Status: > 8 hours  NPO Liquid Status: > 8 hours           Airway   Mallampati: I  TM distance: >3 FB  Neck ROM: full  No difficulty expected  Dental      Pulmonary    (+) recent URI, sleep apnea (recovered),   (-) COPD, asthma, shortness of breath, not a smoker  Cardiovascular     (-) hypertension, past MI, CAD, dysrhythmias, angina, cardiac stents, CABG, hyperlipidemia      Neuro/Psych  (-) TIA, CVASeizures: x1 after chemorx taxol No Meds   GI/Hepatic/Renal/Endo    (+) morbid obesity,  liver disease, diabetes mellitus (NO Rx) type 2, hypothyroidism,   (-)  obesity    Musculoskeletal     Abdominal    Substance History      OB/GYN          Other      history of cancer ([facility] 8 YRS AGO ) remission                    Anesthesia Plan    ASA 3     general   (Propofol Infusion as part of Anti PONV tech )  intravenous induction   Anesthetic plan, all risks, benefits, and alternatives have been provided, discussed and informed consent has been obtained with: patient.    Plan discussed with CRNA.

## 2018-11-13 NOTE — H&P
Pre-Op H&P  Norma Bee  [id number]  1980    Chief complaint: RUQ abdominal pain    HPI:    Patient is a 38 y.o.female who presents with history of RUQ abdominal pain and found to have cholelithiasis and here today for laparoscopic cholecystectomy with intraoperative cholangiogram, liver biopsy    Review of Systems:  General ROS: negative for chills, fever or skin lesions;  No changes since last office visit  Cardiovascular ROS: no chest pain or dyspnea on exertion  Respiratory ROS: no cough, shortness of breath, or wheezing    Allergies: No Known Allergies    Home Meds:    No current facility-administered medications on file prior to encounter.      Current Outpatient Medications on File Prior to Encounter   Medication Sig Dispense Refill   • [DISCONTINUED] cholecalciferol (VITAMIN D3) 1000 UNITS tablet Take 1,000 Units by mouth Daily.     • [DISCONTINUED] levothyroxine (SYNTHROID, LEVOTHROID) 50 MCG tablet Take 1 tablet by mouth Daily. (Patient taking differently: Take 75 mcg by mouth Daily.) 90 tablet 0   • FREESTYLE LITE test strip USE ONE STRIP TO TEST DAILY 100 each 0   • Lancets (FREESTYLE) lancets      • [DISCONTINUED] Multiple Vitamins-Minerals (MULTI-VITAMIN GUMMIES PO) Take  by mouth daily.     • [DISCONTINUED] Omega-3 Fatty Acids (FISH OIL) 1000 MG capsule capsule Take  by mouth daily with breakfast.         PMH:   Past Medical History:   Diagnosis Date   • DDD (degenerative disc disease), lumbar    • Diabetes mellitus (CMS/HCC)     type dm, HAIC is currently normal, only monitor HAIC   • Disease of thyroid gland    • Endometrial cancer (CMS/HCC)     follows with gyn onc at    • Fatty liver    • Fatty liver    • Irritation of right eye 04/07/2016    she will see optho today for exam  Aye [facility] (Internal Medicine)   • Leukocytosis 11/10/2015    repreat in 2 weeks  Aye [facility] (Internal Medicine)   • Low back pain    • Lumbar back pain     L5-S1   • Ovarian cancer (CMS/HCC)  10/26/2015    followed by gyn/onc [facility]  [facility] (Internal Medicine)   • PONV (postoperative nausea and vomiting)    • PTSD (post-traumatic stress disorder)    • PTSD (post-traumatic stress disorder)    • RUQ pain 10/26/2015     u/s  Aye Ventura (Internal Medicine)   • Sleep apnea with use of continuous positive airway pressure (CPAP)    • Thrombocytosis (CMS/HCC)      PSH:    Past Surgical History:   Procedure Laterality Date   • HYSTERECTOMY     • INNER EAR SURGERY     • LAPAROTOMY OOPHERECTOMY Right    • LEFT OOPHORECTOMY       Social History:   Tobacco:   Social History     Tobacco Use   Smoking Status Never Smoker   Smokeless Tobacco Never Used      Alcohol:     Social History     Substance and Sexual Activity   Alcohol Use No    Comment: Rare       Vitals:           /95 (BP Location: Right arm, Patient Position: Lying)   Pulse 89   Temp 97.7 °F (36.5 °C) (Temporal)   Resp 18   SpO2 97%     Physical Exam:  General Appearance:    Alert, cooperative, no distress, appears stated age   Head:    Normocephalic, without obvious abnormality, atraumatic   Lungs:     Clear to auscultation bilaterally, respirations unlabored    Heart:   Regular rate and rhythm, S1 and S2 normal, no murmur, rub    or gallop    Abdomen:    Soft, mild tenderness RUQ.  +bowel sounds   Breast Exam:    deferred   Genitalia:    deferred   Extremities:   Extremities normal, atraumatic, no cyanosis or edema   Skin:   Skin color, texture, turgor normal, no rashes or lesions   Neurologic:   Grossly intact   Results Review  I reviewed the patient's new clinical results.    Cancer Staging (if applicable)  Cancer Patient: __ yes _x_no __unknown; If yes, clinical stage T:__ N:__M:__, stage group or __N/A    Impression: Cholelithiasis    Plan: Laparoscopic cholecystectomy with intraoperative cholangiogram, liver biopsy    Izzy Anton, APRN   11/13/2018   9:54 AM
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
## 2018-11-13 NOTE — ANESTHESIA POSTPROCEDURE EVALUATION
Patient: Norma Bee    Procedure Summary     Date:  11/13/18 Room / Location:   [facility] /  [facility]    Anesthesia Start:  1046 Anesthesia Stop:  1210    Procedure:  CHOLECYSTECTOMY LAPAROSCOPIC, LIVER BIOPSY (N/A Abdomen) Diagnosis:      Surgeon:  Mitchell Batista MD Provider:  Mayo Perez MD    Anesthesia Type:  general ASA Status:  3          Anesthesia Type: general  Last vitals  BP   141/95 (11/13/18 0930)   Temp   97.7 °F (36.5 °C) (11/13/18 0930)   Pulse   89 (11/13/18 0930)   Resp   18 (11/13/18 0930)     SpO2   97 % (11/13/18 0930)     Post Anesthesia Care and Evaluation    Patient location during evaluation: PACU  Patient participation: complete - patient participated  Level of consciousness: awake and alert  Pain score: 0  Pain management: adequate  Airway patency: patent  Anesthetic complications: No anesthetic complications  PONV Status: none  Cardiovascular status: hemodynamically stable and acceptable  Respiratory status: nonlabored ventilation, acceptable and nasal cannula  Hydration status: acceptable

## 2018-11-13 NOTE — OP NOTE
Operative Report    Patient Name:  Norma Bee  YOB: 1980  [id number]  11/13/2018      PREOPERATIVE DIAGNOSIS: Symptomatic cholelithiasis and fatty liver disease      POSTOPERATIVE DIAGNOSIS: Same        PROCEDURE PERFORMED:     Laparoscopic cholecystectomy, liver biopsy       SURGEON: Mitchell Batista MD      ASSISTANT: None        SPECIMENS: Gallbladder and contents        ANESTHESIA: General.        FINDINGS:     1. Gallbladder in standard positioning , with profoundly fatty fiver increasing the difficulty of the procedure    2. Intra-operative cholangiogram could not be performed due to difficulty with cannulation, largely due to the aforementioned fatty liver disease       INDICATIONS:      The patient is a 38 y.o. female with a history of abdominal pain, concerning for symptomatic cholelithiasis. Pre-operative imaging including U/S  confirmed the diagnosis. The risks and benefits of Laparoscopic cholecystectomy with cholangiography were discussed with the patient and their family and they agree to proceed.        DESCRIPTION OF PROCEDURE:     After obtaining informed consent, the patient was taken to the operating room and placed in the supine position. After appropriate DVT and antibiotic prophylaxis, general anesthesia was induced. The abdomen was prepped and draped in standard sterile fashion, and after infiltrating the skin with local anesthetic, a transverse 12mm skin incision was made 20 cm inferior to the xiphoid process in the midline.  An optically guided trocar was advanced without difficulty into the abdominal cavity.The abdomen was insufflated with carbon dioxide gas to a pressure of 15 mm Hg, and a laparoscope advanced through the trocar and the abdominal contents were inspected. There was no evidence of bowel, bladder, or visceral entry with entrance of the trocar . At this point, after infiltrating the skin with local anesthetic, a standard laparoscopic cholecystectomy  "trocar placement schema was followed.     The liver had massive amounts of fatty change with at least 6 cm of inferior edge below the gallbladder fossa.  This greatly increased the difficulty of the case, as despite excellent port placement, working around this massive liver was quite difficult.  The gallbladder was grasped and elevated superiorly. Using meticulous blunt dissection , the cystic duct and cystic artery were bluntly dissected free of other structures and clearly identified using the \"Critical View\" technique. The cystic artery was then clipped twice proximally and once distally, and divided between the clips.     The infundibulum of the gallbladder was then ligated with a 2-0 silk suture.  A cholangiocatheter placed within the infundibulum to allow for cholangiogram.  This was done, given the massive fatty liver disease, and the difficulty with seeing the cystic duct except for very proximally.  A cholangiogram was attempted, but we could not get a good seal, but we did confirm that we were dealing with the infundibulum of the gallbladder and a small trickle was going into the cystic duct, thereby confirming our anatomy.  The cholangiogram catheter was then removed, and a stone removed from the infundibulum as well.  The cystic duct was ligated using a 2-0 silk suture tied laparoscopically,  reinforced with hemoclips, and divided.     The gallbladder was then dissected free of the gallbladder fossa using a combination of electrocautery and blunt dissection . There was a small posterior branch of the artery that was clipped and divided . The gallbladder was then placed in an Endocatch bag, and removed from the inferiormost trocar site. It was inspected on the back table, correlated with intra-operative findings, and passed off as specimen.  A small piece of the inferior to the liver was then resected sharply and passed off as liver biopsy specimen.  Bleeding from this area was controlled using " electrocautery.     The right upper quadrant was then inspected. The cystic duct and cystic artery stumps were intact without bleeding or biliary leak. The right upper quadrant was irrigated with saline until clear. The abdomen was deflated and reinsufflated to make sure pneumoperitoneum was not tamponading any bleeding and there was none. The abdomen was again irrigated with saline until clear, and all trocars removed under direct and laparoscopic visualization. The fascia at the inferiormost incision was closed using a 0 Vicryl suture and a laparoscopic suture passer.  There were adhesions below this level from the patient's previous operations, and these were not disturbed.. The wounds were irrigated with normal saline, and closed in each area using absorbable subcuticular suture. The incisions were dressed in standard sterile fashion and covered with dry dressings. The patient recovered from anesthesia, was extubated in the operating room, and transferred to the PACU in stable condition.  All sponge and needle counts were correct times two at the completion of the procedure. There were no immediate complications.     Mitchell Batista MD  11/13/2018  12:08 PM

## 2018-11-13 NOTE — BRIEF OP NOTE
CHOLECYSTECTOMY LAPAROSCOPIC INTRAOPERATIVE CHOLANGIOGRAM  Progress Note    Norma Bee  11/13/2018    Pre-op Diagnosis:   Symptomatic cholelithiasis  Fatty liver disease       Post-Op Diagnosis Codes:   Same    Procedure/CPT® Codes:      Procedure(s):  CHOLECYSTECTOMY LAPAROSCOPIC, LIVER BIOPSY    Surgeon(s):  Mitchell Batista MD    Anesthesia: General    Staff:   Circulator: Mela Chatterjee RN; Keira Cowart RN  Scrub Person: Jane Jiang; Shirlene Watt  Nursing Assistant: Basil Warner    Estimated Blood Loss: minimal    Urine Voided: * No values recorded between 11/13/2018 10:46 AM and 11/13/2018 12:02 PM *    Specimens:                ID Type Source Tests Collected by Time   A :  Tissue Gallbladder TISSUE PATHOLOGY EXAM Mitchell Batista MD 11/13/2018 1148   B :  Tissue Liver TISSUE PATHOLOGY EXAM Mitchell Batista MD 11/13/2018 1152         Drains:      Findings:   1. Unable to perform IOC due to obesity and difficult cannulation  2. Significant fatty liver disease    Complications: None      Mitchell Batista MD     Date: 11/13/2018  Time: 12:07 PM

## 2018-11-15 LAB
CYTO UR: NORMAL
LAB AP CASE REPORT: NORMAL
LAB AP CLINICAL INFORMATION: NORMAL
PATH REPORT.FINAL DX SPEC: NORMAL
PATH REPORT.GROSS SPEC: NORMAL

## 2019-09-24 ENCOUNTER — APPOINTMENT (OUTPATIENT)
Dept: GENERAL RADIOLOGY | Facility: HOSPITAL | Age: 39
End: 2019-09-24

## 2019-09-24 ENCOUNTER — APPOINTMENT (OUTPATIENT)
Dept: CT IMAGING | Facility: HOSPITAL | Age: 39
End: 2019-09-24

## 2019-09-24 ENCOUNTER — HOSPITAL ENCOUNTER (EMERGENCY)
Facility: HOSPITAL | Age: 39
Discharge: HOME | End: 2019-09-24
Attending: EMERGENCY MEDICINE | Admitting: EMERGENCY MEDICINE

## 2019-09-24 VITALS
HEIGHT: 66 IN | BODY MASS INDEX: 47.09 KG/M2 | WEIGHT: 293 LBS | SYSTOLIC BLOOD PRESSURE: 132 MMHG | DIASTOLIC BLOOD PRESSURE: 86 MMHG | RESPIRATION RATE: 18 BRPM | TEMPERATURE: 97.9 F | HEART RATE: 91 BPM | OXYGEN SATURATION: 94 %

## 2019-09-24 DIAGNOSIS — N39.0 URINARY TRACT INFECTION WITHOUT HEMATURIA, SITE UNSPECIFIED: ICD-10-CM

## 2019-09-24 DIAGNOSIS — R10.9 LEFT SIDED ABDOMINAL PAIN: Primary | ICD-10-CM

## 2019-09-24 LAB
ALBUMIN SERPL-MCNC: 4.2 G/DL (ref 3.5–5.2)
ALBUMIN/GLOB SERPL: 1 G/DL
ALP SERPL-CCNC: 108 U/L (ref 39–117)
ALT SERPL W P-5'-P-CCNC: 29 U/L (ref 1–33)
ANION GAP SERPL CALCULATED.3IONS-SCNC: 14 MMOL/L (ref 5–15)
AST SERPL-CCNC: 59 U/L (ref 1–32)
BACTERIA UR QL AUTO: ABNORMAL /HPF
BASOPHILS # BLD AUTO: 0.06 10*3/MM3 (ref 0–0.2)
BASOPHILS NFR BLD AUTO: 0.5 % (ref 0–1.5)
BILIRUB SERPL-MCNC: 0.4 MG/DL (ref 0.2–1.2)
BILIRUB UR QL STRIP: NEGATIVE
BUN BLD-MCNC: 14 MG/DL (ref 6–20)
BUN/CREAT SERPL: 14.7 (ref 7–25)
CALCIUM SPEC-SCNC: 9.1 MG/DL (ref 8.6–10.5)
CHLORIDE SERPL-SCNC: 98 MMOL/L (ref 98–107)
CLARITY UR: ABNORMAL
CO2 SERPL-SCNC: 21 MMOL/L (ref 22–29)
COLOR UR: YELLOW
CREAT BLD-MCNC: 0.95 MG/DL (ref 0.57–1)
DEPRECATED RDW RBC AUTO: 47.1 FL (ref 37–54)
EOSINOPHIL # BLD AUTO: 0.12 10*3/MM3 (ref 0–0.4)
EOSINOPHIL NFR BLD AUTO: 1 % (ref 0.3–6.2)
ERYTHROCYTE [DISTWIDTH] IN BLOOD BY AUTOMATED COUNT: 15.8 % (ref 12.3–15.4)
GFR SERPL CREATININE-BSD FRML MDRD: 65 ML/MIN/1.73
GLOBULIN UR ELPH-MCNC: 4.1 GM/DL
GLUCOSE BLD-MCNC: 250 MG/DL (ref 65–99)
GLUCOSE UR STRIP-MCNC: ABNORMAL MG/DL
HCT VFR BLD AUTO: 39 % (ref 34–46.6)
HGB BLD-MCNC: 12.9 G/DL (ref 12–15.9)
HGB UR QL STRIP.AUTO: NEGATIVE
HOLD SPECIMEN: NORMAL
HOLD SPECIMEN: NORMAL
HYALINE CASTS UR QL AUTO: ABNORMAL /LPF
IMM GRANULOCYTES # BLD AUTO: 0.14 10*3/MM3 (ref 0–0.05)
IMM GRANULOCYTES NFR BLD AUTO: 1.2 % (ref 0–0.5)
KETONES UR QL STRIP: NEGATIVE
LEUKOCYTE ESTERASE UR QL STRIP.AUTO: ABNORMAL
LIPASE SERPL-CCNC: 44 U/L (ref 13–60)
LYMPHOCYTES # BLD AUTO: 1.36 10*3/MM3 (ref 0.7–3.1)
LYMPHOCYTES NFR BLD AUTO: 11.2 % (ref 19.6–45.3)
MCH RBC QN AUTO: 27.9 PG (ref 26.6–33)
MCHC RBC AUTO-ENTMCNC: 33.1 G/DL (ref 31.5–35.7)
MCV RBC AUTO: 84.4 FL (ref 79–97)
MONOCYTES # BLD AUTO: 0.75 10*3/MM3 (ref 0.1–0.9)
MONOCYTES NFR BLD AUTO: 6.2 % (ref 5–12)
NEUTROPHILS # BLD AUTO: 9.71 10*3/MM3 (ref 1.7–7)
NEUTROPHILS NFR BLD AUTO: 79.9 % (ref 42.7–76)
NITRITE UR QL STRIP: NEGATIVE
NRBC BLD AUTO-RTO: 0 /100 WBC (ref 0–0.2)
PH UR STRIP.AUTO: <=5 [PH] (ref 5–8)
PLATELET # BLD AUTO: 286 10*3/MM3 (ref 140–450)
PMV BLD AUTO: 9.9 FL (ref 6–12)
POTASSIUM BLD-SCNC: 5 MMOL/L (ref 3.5–5.2)
PROT SERPL-MCNC: 8.3 G/DL (ref 6–8.5)
PROT UR QL STRIP: ABNORMAL
RBC # BLD AUTO: 4.62 10*6/MM3 (ref 3.77–5.28)
RBC # UR: ABNORMAL /HPF
REF LAB TEST METHOD: ABNORMAL
SODIUM BLD-SCNC: 133 MMOL/L (ref 136–145)
SP GR UR STRIP: 1.02 (ref 1–1.03)
SQUAMOUS #/AREA URNS HPF: ABNORMAL /HPF
TROPONIN T SERPL-MCNC: <0.01 NG/ML (ref 0–0.03)
UROBILINOGEN UR QL STRIP: ABNORMAL
WBC NRBC COR # BLD: 12.14 10*3/MM3 (ref 3.4–10.8)
WBC UR QL AUTO: ABNORMAL /HPF
WHOLE BLOOD HOLD SPECIMEN: NORMAL
WHOLE BLOOD HOLD SPECIMEN: NORMAL

## 2019-09-24 PROCEDURE — 85025 COMPLETE CBC W/AUTO DIFF WBC: CPT | Performed by: EMERGENCY MEDICINE

## 2019-09-24 PROCEDURE — 84484 ASSAY OF TROPONIN QUANT: CPT | Performed by: EMERGENCY MEDICINE

## 2019-09-24 PROCEDURE — 99284 EMERGENCY DEPT VISIT MOD MDM: CPT

## 2019-09-24 PROCEDURE — 83690 ASSAY OF LIPASE: CPT | Performed by: EMERGENCY MEDICINE

## 2019-09-24 PROCEDURE — 96365 THER/PROPH/DIAG IV INF INIT: CPT

## 2019-09-24 PROCEDURE — 25010000002 IOPAMIDOL 61 % SOLUTION: Performed by: EMERGENCY MEDICINE

## 2019-09-24 PROCEDURE — 25010000002 CEFTRIAXONE PER 250 MG: Performed by: EMERGENCY MEDICINE

## 2019-09-24 PROCEDURE — 96375 TX/PRO/DX INJ NEW DRUG ADDON: CPT

## 2019-09-24 PROCEDURE — 80053 COMPREHEN METABOLIC PANEL: CPT | Performed by: EMERGENCY MEDICINE

## 2019-09-24 PROCEDURE — 93005 ELECTROCARDIOGRAM TRACING: CPT | Performed by: EMERGENCY MEDICINE

## 2019-09-24 PROCEDURE — 93005 ELECTROCARDIOGRAM TRACING: CPT

## 2019-09-24 PROCEDURE — 25010000002 MORPHINE PER 10 MG: Performed by: EMERGENCY MEDICINE

## 2019-09-24 PROCEDURE — 71045 X-RAY EXAM CHEST 1 VIEW: CPT

## 2019-09-24 PROCEDURE — 25010000002 ONDANSETRON PER 1 MG: Performed by: EMERGENCY MEDICINE

## 2019-09-24 PROCEDURE — 81001 URINALYSIS AUTO W/SCOPE: CPT | Performed by: EMERGENCY MEDICINE

## 2019-09-24 PROCEDURE — 74177 CT ABD & PELVIS W/CONTRAST: CPT

## 2019-09-24 RX ORDER — CEPHALEXIN 500 MG/1
500 CAPSULE ORAL 4 TIMES DAILY
Qty: 40 CAPSULE | Refills: 0 | Status: SHIPPED | OUTPATIENT
Start: 2019-09-24 | End: 2019-10-04

## 2019-09-24 RX ORDER — ONDANSETRON 4 MG/1
4 TABLET, ORALLY DISINTEGRATING ORAL 4 TIMES DAILY PRN
Qty: 15 TABLET | Refills: 0 | Status: SHIPPED | OUTPATIENT
Start: 2019-09-24 | End: 2021-08-25

## 2019-09-24 RX ORDER — MORPHINE SULFATE 4 MG/ML
4 INJECTION, SOLUTION INTRAMUSCULAR; INTRAVENOUS
Status: DISCONTINUED | OUTPATIENT
Start: 2019-09-24 | End: 2019-09-24 | Stop reason: HOSPADM

## 2019-09-24 RX ORDER — ONDANSETRON 2 MG/ML
4 INJECTION INTRAMUSCULAR; INTRAVENOUS
Status: DISCONTINUED | OUTPATIENT
Start: 2019-09-24 | End: 2019-09-24 | Stop reason: HOSPADM

## 2019-09-24 RX ORDER — HYDROCODONE BITARTRATE AND ACETAMINOPHEN 5; 325 MG/1; MG/1
1 TABLET ORAL EVERY 4 HOURS PRN
Qty: 6 TABLET | Refills: 0 | Status: SHIPPED | OUTPATIENT
Start: 2019-09-24 | End: 2021-08-25

## 2019-09-24 RX ORDER — SODIUM CHLORIDE 0.9 % (FLUSH) 0.9 %
10 SYRINGE (ML) INJECTION AS NEEDED
Status: DISCONTINUED | OUTPATIENT
Start: 2019-09-24 | End: 2019-09-24 | Stop reason: HOSPADM

## 2019-09-24 RX ADMIN — MORPHINE SULFATE 4 MG: 4 INJECTION, SOLUTION INTRAMUSCULAR; INTRAVENOUS at 04:46

## 2019-09-24 RX ADMIN — SODIUM CHLORIDE 1000 ML: 9 INJECTION, SOLUTION INTRAVENOUS at 04:46

## 2019-09-24 RX ADMIN — IOPAMIDOL 100 ML: 612 INJECTION, SOLUTION INTRAVENOUS at 05:02

## 2019-09-24 RX ADMIN — CEFTRIAXONE 1 G: 1 INJECTION, POWDER, FOR SOLUTION INTRAMUSCULAR; INTRAVENOUS at 05:35

## 2019-09-24 RX ADMIN — ONDANSETRON 4 MG: 2 INJECTION INTRAMUSCULAR; INTRAVENOUS at 04:46

## 2019-09-24 NOTE — ED PROVIDER NOTES
[facility] EMERGENCY DEPARTMENT    eMERGENCY dEPARTMENT eNCOUnter      Pt Name: Norma Bee  MRN: [id number]  YOB: 1980  Date of evaluation: 9/24/2019  Provider: Chiki Davis DO    CHIEF COMPLAINT       Chief Complaint   Patient presents with   • Abdominal Pain   • Chest Pain         HISTORY OF PRESENT ILLNESS  (Location/Symptom, Timing/Onset, Context/Setting, Quality, Duration, Modifying Factors, Severity.)   Norma Bee is a 39 y.o. female who presents to the emergency department for evaluation of initially left upper abdomen, lower chest pain onset 6:00 yesterday morning, which ended up resolving but the pain started migrating to her left anterior lateral mid abdomen.  She notes it is dull in nature, waxing and waning consistency.  She notes mild nausea without vomiting, last bowel movement was last night without any blood or acute pain.  Denies any fall, injury or trauma, she has had a prior cholecystectomy as well as a total hysterectomy including bilateral oophorectomy.  She denies any recent illness, no fevers or chills.  She has no other acute systemic complaints at this time.      Nursing notes were reviewed.    REVIEW OF SYSTEMS    (2-9 systems for level 4, 10 or more for level 5)   ROS:  General:  No fevers, no chills, no weakness  Cardiovascular:  No current chest pain, no palpitations  Respiratory:  No shortness of breath, no cough, no wheezing  Gastrointestinal: Positive left anterior mid abdomen pain, no vomiting, no diarrhea  Musculoskeletal:  No muscle pain, no joint pain  Skin:  No rash, no easy bruising  Neurologic:  No speech problems, no headache, no extremity numbness, no extremity tingling, no extremity weakness  Psychiatric:  No anxiety  Genitourinary:  No dysuria, no hematuria    Except as noted above the remainder of the review of systems was reviewed and negative.       PAST MEDICAL HISTORY     Past Medical History:   Diagnosis Date   •  DDD (degenerative disc disease), lumbar    • Diabetes mellitus (CMS/HCC)     type dm, HAIC is currently normal, only monitor HAIC   • Disease of thyroid gland    • Endometrial cancer (CMS/HCC)     follows with gyn onc at    • Fatty liver    • Fatty liver    • Irritation of right eye 04/07/2016    she will see optho today for exam  Aye Ventura (Internal Medicine)   • Leukocytosis 11/10/2015    repreat in 2 weeks  Aye Ventura (Internal Medicine)   • Low back pain    • Lumbar back pain     L5-S1   • Ovarian cancer (CMS/HCC) 10/26/2015    followed by gyn/onc   Aye Ventura (Internal Medicine)   • PONV (postoperative nausea and vomiting)    • PTSD (post-traumatic stress disorder)    • PTSD (post-traumatic stress disorder)    • RUQ pain 10/26/2015     u/s  Aye Ventura (Internal Medicine)   • Sleep apnea with use of continuous positive airway pressure (CPAP)    • Thrombocytosis (CMS/HCC)          SURGICAL HISTORY       Past Surgical History:   Procedure Laterality Date   • CHOLECYSTECTOMY WITH INTRAOPERATIVE CHOLANGIOGRAM N/A 11/13/2018    Procedure: CHOLECYSTECTOMY LAPAROSCOPIC, LIVER BIOPSY;  Surgeon: Mitchell Batista MD;  Location:  [facility];  Service: General   • FOREIGN BODY REMOVAL Right 7/27/2017    Procedure: RIGHT REMOVAL FOREIGN BODY EAR CANAL;  Surgeon: Frankie Yu MD;  Location:  [facility];  Service:    • HYSTERECTOMY     • INNER EAR SURGERY     • LAPAROTOMY OOPHERECTOMY Right    • LEFT OOPHORECTOMY           CURRENT MEDICATIONS       Current Facility-Administered Medications:   •  Morphine sulfate (PF) injection 4 mg, 4 mg, Intravenous, Q3H PRN, Chiki Davis DO, 4 mg at 09/24/19 0446  •  ondansetron (ZOFRAN) injection 4 mg, 4 mg, Intravenous, Q30 Min PRN, Chiki Davis DO, 4 mg at 09/24/19 0446  •  sodium chloride 0.9 % flush 10 mL, 10 mL, Intravenous, PRN, Chiki Davis DO    Current Outpatient Medications:   •  levothyroxine (SYNTHROID,  LEVOTHROID) 75 MCG tablet, Take 125 mcg by mouth Daily., Disp: , Rfl:   •  cephalexin (KEFLEX) 500 MG capsule, Take 1 capsule by mouth 4 (Four) Times a Day for 10 days., Disp: 40 capsule, Rfl: 0  •  HYDROcodone-acetaminophen (NORCO) 5-325 MG per tablet, Take 1 tablet by mouth Every 4 (Four) Hours As Needed for Moderate Pain ., Disp: 6 tablet, Rfl: 0  •  Lancets (FREESTYLE) lancets, , Disp: , Rfl:   •  ondansetron ODT (ZOFRAN-ODT) 4 MG disintegrating tablet, Take 1 tablet by mouth 4 (Four) Times a Day As Needed for Nausea or Vomiting., Disp: 15 tablet, Rfl: 0    ALLERGIES     Latex    FAMILY HISTORY       Family History   Problem Relation Age of Onset   • Arthritis Other         osteoarthritis   • Multiple myeloma Other    • Lung cancer Other    • Lymphoma Other         non-Hodgkin's lymphoma   • Thyroid disease Other    • Lymphoma Mother    • Alzheimer's disease Mother    • Thyroid disease Mother    • Diabetes Father    • Cirrhosis Paternal Grandfather           SOCIAL HISTORY       Social History     Socioeconomic History   • Marital status: Single     Spouse name: Not on file   • Number of children: Not on file   • Years of education: Not on file   • Highest education level: Not on file   Tobacco Use   • Smoking status: Never Smoker   • Smokeless tobacco: Never Used   Substance and Sexual Activity   • Alcohol use: No     Comment: Rare   • Drug use: No   • Sexual activity: Defer     Comment: Single          PHYSICAL EXAM    (up to 7 for level 4, 8 or more for level 5)     Vitals:    09/24/19 0345 09/24/19 0400 09/24/19 0430 09/24/19 0448   BP: (!) 153/115 (!) 150/101 (!) 149/105 139/80   BP Location:       Patient Position:       Pulse:  91 95 92   Resp:       Temp:       TempSrc:       SpO2: 97% 96% 93% 95%   Weight:       Height:           Physical Exam  General :Patient is morbidly obese, awake, alert, oriented, in no acute distress, nontoxic appearing  HEENT: Pupils are equally round and reactive to light,  EOMI, conjunctivae clear, sclerae white, there is no injection no icterus.  Oral mucosa is moist, no exudate. Uvula is midline  Neck: Neck is supple, full range of motion, trachea midline  Cardiac: Heart regular rate, rhythm, no murmurs, rubs, or gallops  Lungs: Lungs are clear to auscultation, there is no wheezing, rhonchi, or rales. There is no use of accessory muscles.  Chest wall: There is no tenderness to palpation over the chest wall or over ribs  Abdomen: Abdomen is obese, soft, nondistended.  There is tenderness along the left anterior lateral mid abdomen and flank without any peritoneal signs, bowel sounds are present diffusely throughout.  There is no firm or pulsatile masses, no rebound rigidity or guarding.   Musculoskeletal: 5 out of 5 strength in all 4 extremities.  No focal muscle deficits are appreciated  Neuro: Motor intact, sensory intact, level of consciousness is normal  Dermatology: Skin is warm and dry  Psych: Mentation is grossly normal, cognition is grossly normal. Affect is appropriate.      DIAGNOSTIC RESULTS     EKG: All EKG's are interpreted by the Emergency Department Physician who either signs or Co-signs this chart in the absence of a cardiologist.    ECG 12 Lead   Final Result   Test Reason : Upper Abdominal Pain Triage Protocol   Blood Pressure : **/** mmHG   Vent. Rate : 090 BPM     Atrial Rate : 090 BPM      P-R Int : 140 ms          QRS Dur : 090 ms       QT Int : 390 ms       P-R-T Axes : 059 -10 010 degrees      QTc Int : 477 ms      Normal sinus rhythm   RSR' or QR pattern in V1 suggests right ventricular conduction delay   Borderline ECG   When compared with ECG of 07-NOV-2018 09:20,   Nonspecific T wave abnormality now evident in Anterior leads   Confirmed by YURY FROST MD ([id number]) on 9/24/2019 4:00:57 AM      Referred By: DO FROST           Confirmed By:YURY FROST MD          RADIOLOGY:   Non-plain film images such as CT, Ultrasound and MRI are read by the  radiologist. Plain radiographic images are visualized and preliminarily interpreted by the emergency physician with the below findings:      [] Radiologist's Report Reviewed:  CT Abdomen Pelvis With Contrast   Final Result      1. No clearly acute process in the abdomen or pelvis. Normal appendix.   2. Hepatic steatosis and hepatosplenomegaly.   3. Enlarged gastrohepatic ligament lymph node. Correlate clinically.   4. Status post cholecystectomy and hysterectomy.               Signer Name: Lionel Mehta MD    Signed: 9/24/2019 5:26 AM    Workstation Name: [facility]     Radiology [facility]      XR Chest 1 View   Final Result   No acute cardiopulmonary findings.      Signer Name: Lionel Mehta MD    Signed: 9/24/2019 4:25 AM    Workstation Name: [facility]     Radiology [facility]            ED BEDSIDE ULTRASOUND:   Performed by ED Physician - none    LABS:    I have reviewed and interpreted all of the currently available lab results from this visit (if applicable):  Results for orders placed or performed during the hospital encounter of 09/24/19   Comprehensive Metabolic Panel   Result Value Ref Range    Glucose 250 (H) 65 - 99 mg/dL    BUN 14 6 - 20 mg/dL    Creatinine 0.95 0.57 - 1.00 mg/dL    Sodium 133 (L) 136 - 145 mmol/L    Potassium 5.0 3.5 - 5.2 mmol/L    Chloride 98 98 - 107 mmol/L    CO2 21.0 (L) 22.0 - 29.0 mmol/L    Calcium 9.1 8.6 - 10.5 mg/dL    Total Protein 8.3 6.0 - 8.5 g/dL    Albumin 4.20 3.50 - 5.20 g/dL    ALT (SGPT) 29 1 - 33 U/L    AST (SGOT) 59 (H) 1 - 32 U/L    Alkaline Phosphatase 108 39 - 117 U/L    Total Bilirubin 0.4 0.2 - 1.2 mg/dL    eGFR Non African Amer 65 >60 mL/min/1.73    Globulin 4.1 gm/dL    A/G Ratio 1.0 g/dL    BUN/Creatinine Ratio 14.7 7.0 - 25.0    Anion Gap 14.0 5.0 - 15.0 mmol/L   Lipase   Result Value Ref Range    Lipase 44 13 - 60 U/L   Troponin   Result Value Ref Range    Troponin T <0.010 0.000 - 0.030 ng/mL   Urinalysis With  Microscopic If Indicated (No Culture) - Urine, Clean Catch   Result Value Ref Range    Color, UA Yellow Yellow, Straw    Appearance, UA Cloudy (A) Clear    pH, UA <=5.0 5.0 - 8.0    Specific Gravity, UA 1.025 1.001 - 1.030    Glucose,  mg/dL (Trace) (A) Negative    Ketones, UA Negative Negative    Bilirubin, UA Negative Negative    Blood, UA Negative Negative    Protein, UA Trace (A) Negative    Leuk Esterase, UA Trace (A) Negative    Nitrite, UA Negative Negative    Urobilinogen, UA 0.2 E.U./dL 0.2 - 1.0 E.U./dL   CBC Auto Differential   Result Value Ref Range    WBC 12.14 (H) 3.40 - 10.80 10*3/mm3    RBC 4.62 3.77 - 5.28 10*6/mm3    Hemoglobin 12.9 12.0 - 15.9 g/dL    Hematocrit 39.0 34.0 - 46.6 %    MCV 84.4 79.0 - 97.0 fL    MCH 27.9 26.6 - 33.0 pg    MCHC 33.1 31.5 - 35.7 g/dL    RDW 15.8 (H) 12.3 - 15.4 %    RDW-SD 47.1 37.0 - 54.0 fl    MPV 9.9 6.0 - 12.0 fL    Platelets 286 140 - 450 10*3/mm3    Neutrophil % 79.9 (H) 42.7 - 76.0 %    Lymphocyte % 11.2 (L) 19.6 - 45.3 %    Monocyte % 6.2 5.0 - 12.0 %    Eosinophil % 1.0 0.3 - 6.2 %    Basophil % 0.5 0.0 - 1.5 %    Immature Grans % 1.2 (H) 0.0 - 0.5 %    Neutrophils, Absolute 9.71 (H) 1.70 - 7.00 10*3/mm3    Lymphocytes, Absolute 1.36 0.70 - 3.10 10*3/mm3    Monocytes, Absolute 0.75 0.10 - 0.90 10*3/mm3    Eosinophils, Absolute 0.12 0.00 - 0.40 10*3/mm3    Basophils, Absolute 0.06 0.00 - 0.20 10*3/mm3    Immature Grans, Absolute 0.14 (H) 0.00 - 0.05 10*3/mm3    nRBC 0.0 0.0 - 0.2 /100 WBC   Urinalysis, Microscopic Only - Urine, Clean Catch   Result Value Ref Range    RBC, UA 0-2 None Seen, 0-2 /HPF    WBC, UA 3-5 (A) None Seen, 0-2 /HPF    Bacteria, UA None Seen None Seen, Trace /HPF    Squamous Epithelial Cells, UA 3-6 (A) None Seen, 0-2 /HPF    Hyaline Casts, UA 0-6 0 - 6 /LPF    Methodology Automated Microscopy    Light Blue Top   Result Value Ref Range    Extra Tube hold for add-on    Green Top (Gel)   Result Value Ref Range    Extra Tube Hold for  add-ons.    Lavender Top   Result Value Ref Range    Extra Tube hold for add-on    Gold Top - SST   Result Value Ref Range    Extra Tube Hold for add-ons.         All other labs were within normal range or not returned as of this dictation.      EMERGENCY DEPARTMENT COURSE and DIFFERENTIAL DIAGNOSIS/MDM:   Vitals:    Vitals:    09/24/19 0345 09/24/19 0400 09/24/19 0430 09/24/19 0448   BP: (!) 153/115 (!) 150/101 (!) 149/105 139/80   BP Location:       Patient Position:       Pulse:  91 95 92   Resp:       Temp:       TempSrc:       SpO2: 97% 96% 93% 95%   Weight:       Height:           ED Course as of Sep 24 0606   Tue Sep 24, 2019   0559 Lexy. Request # : [id number]  [AP]   0559 LUCY query complete. Treatment plan to include limited course of prescribed  controlled substance. Risks including addiction, benefits, and alternatives presented to patient.    [AP]      ED Course User Index  [AP] Chiki Davis, DO   !    Patient with left-sided anterior lateral abdominal pain since yesterday afternoon, initially had some mild left lower chest pain which has resolved.  No chest pain currently, no peritoneal signs on examination, her vital signs are stable upon arrival, she has generalized tenderness mainly in the left mid lateral abdomen, she is morbidly obese, has had multiple abdominal surgeries.  We discussed obtaining IV, labs, imaging, giving her symptomatic therapies.  Results reviewed as above.  Urinalysis with underlying urinary tract infection, remainder blood work and imaging unremarkable.  Patient updated on these findings, discussed treating her for underlying UTI, nausea medication, fluids for the next few days follow-up with her PCP for reevaluation, any worsening symptoms or return of her pain she will return back to the ED for re-eval.  Any fever, chills, inability to tolerate antibiotics, return to ED.  the patient will follow-up with their PCP in 1-2 days for reevaluation.  If the patient or  family members have any further concerns or patient has any worsening symptoms they will return to the ED for reevaluation.      MEDICATIONS ADMINISTERED IN ED:  Medications   sodium chloride 0.9 % flush 10 mL (not administered)   Morphine sulfate (PF) injection 4 mg (4 mg Intravenous Given 9/24/19 0446)   ondansetron (ZOFRAN) injection 4 mg (4 mg Intravenous Given 9/24/19 0446)   sodium chloride 0.9 % bolus 1,000 mL (0 mL Intravenous Stopped 9/24/19 0458)   iopamidol (ISOVUE-300) 61 % injection 100 mL (100 mL Intravenous Given 9/24/19 0502)   cefTRIAXone (ROCEPHIN) 1 g/100 mL 0.9% NS (MBP) (1 g Intravenous New Bag 9/24/19 8099)       PROCEDURES:  Procedures    CRITICAL CARE TIME    Total Critical Care time was 0 minutes, excluding separately reportable procedures.   There was a high probability of clinically significant/life threatening deterioration in the patient's condition which required my urgent intervention.      FINAL IMPRESSION      1. Left sided abdominal pain    2. Urinary tract infection without hematuria, site unspecified          DISPOSITION/PLAN     ED Disposition     ED Disposition Condition Comment    Discharge Stable           PATIENT REFERRED TO:  Silvia Riggs PA-C  [address] [place], [place] [id number] [phone]    In 2 days      [facility] Emergency Department  [address] [address] [phone]    If symptoms worsen      DISCHARGE MEDICATIONS:     Medication List      START taking these medications    cephalexin 500 MG capsule  Commonly known as:  KEFLEX  Take 1 capsule by mouth 4 (Four) Times a Day for 10 days.     HYDROcodone-acetaminophen 5-325 MG per tablet  Commonly known as:  NORCO  Take 1 tablet by mouth Every 4 (Four) Hours As Needed for Moderate Pain .     ondansetron ODT 4 MG disintegrating tablet  Commonly known as:  ZOFRAN-ODT  Take 1 tablet by mouth 4 (Four) Times a Day As Needed for Nausea or   Vomiting.         CONTINUE taking these medications    freestyle lancets     levothyroxine 75 MCG tablet  Commonly known as:  SYNTHROID, LEVOTHROID        STOP taking these medications    FREESTYLE LITE test strip  Generic drug:  glucose blood            Comment: Please note this report has been produced using speech recognition software.      Chiki Davis DO  Attending Emergency Physician               Chiki Davis,   09/24/19 0657

## 2021-08-25 PROCEDURE — U0004 COV-19 TEST NON-CDC HGH THRU: HCPCS | Performed by: FAMILY MEDICINE

## (undated) DEVICE — HOLDER: Brand: DEROYAL

## (undated) DEVICE — LUER-LOK 360°: Brand: CONNECTA, LUER-LOK

## (undated) DEVICE — ENDOPATH XCEL UNIVERSAL TROCAR STABLILITY SLEEVES: Brand: ENDOPATH XCEL

## (undated) DEVICE — SNAP KOVER: Brand: UNBRANDED

## (undated) DEVICE — COVER,LIGHT HANDLE,FLX,1/PK: Brand: MEDLINE INDUSTRIES, INC.

## (undated) DEVICE — VISUALIZATION SYSTEM: Brand: CLEARIFY

## (undated) DEVICE — ENDOCUT SCISSOR TIP, DISPOSABLE: Brand: RENEW

## (undated) DEVICE — SYR LUERLOK 30CC

## (undated) DEVICE — GLV SURG SENSICARE MICRO PF LF 7.5 STRL

## (undated) DEVICE — SYR LUERLOK 20CC

## (undated) DEVICE — ENDOPATH XCEL BLADELESS TROCARS WITH STABILITY SLEEVES: Brand: ENDOPATH XCEL

## (undated) DEVICE — PK LAP LASR CHOLE 10

## (undated) DEVICE — ENDOPOUCH RETRIEVER SPECIMEN RETRIEVAL BAGS: Brand: ENDOPOUCH RETRIEVER

## (undated) DEVICE — ST EXT IV TBG W SECUR LK 20IN

## (undated) DEVICE — AIRWY 90MM NO9

## (undated) DEVICE — GOWN,PREVENTION PLUS,XXLARGE,STERILE: Brand: MEDLINE

## (undated) DEVICE — ADHS LIQ MASTISOL 2/3ML

## (undated) DEVICE — SUT SILK 2/0 TIES 18IN A185H

## (undated) DEVICE — SUT VIC 0 UR6 27IN VCP603H

## (undated) DEVICE — NDL FLTR BLNT 18G 1 1/2IN

## (undated) DEVICE — ENT MINOR: Brand: MEDLINE INDUSTRIES, INC.

## (undated) DEVICE — 3M™ TEGADERM™ IV TRANSPARENT FILM DRESSING WITH BORDER 1610: Brand: 3M™ TEGADERM™

## (undated) DEVICE — [HIGH FLOW HEATED INSUFFLATOR TUBING,  DO NOT USE IF PACKAGE IS DAMAGED]

## (undated) DEVICE — ANTIBACTERIAL UNDYED BRAIDED (POLYGLACTIN 910), SYNTHETIC ABSORBABLE SUTURE: Brand: COATED VICRYL

## (undated) DEVICE — ADHESIVE ISLAND DRESSING: Brand: TELFA

## (undated) DEVICE — 3M(TM) STERI-STRIP(TM) BLEND TONE SKIN CLOSURES (NON-REINFORCED) B1553: Brand: 3M™ STERI-STRIP™

## (undated) DEVICE — Device

## (undated) DEVICE — GLV SURG SENSICARE MICRO PF LF 7 STRL